# Patient Record
Sex: FEMALE | Race: WHITE | NOT HISPANIC OR LATINO | Employment: OTHER | URBAN - NONMETROPOLITAN AREA
[De-identification: names, ages, dates, MRNs, and addresses within clinical notes are randomized per-mention and may not be internally consistent; named-entity substitution may affect disease eponyms.]

---

## 2025-03-10 ENCOUNTER — APPOINTMENT (OUTPATIENT)
Dept: CARDIOLOGY | Facility: HOSPITAL | Age: 70
End: 2025-03-10
Payer: MEDICARE

## 2025-03-10 ENCOUNTER — APPOINTMENT (OUTPATIENT)
Dept: RADIOLOGY | Facility: HOSPITAL | Age: 70
End: 2025-03-10
Payer: MEDICARE

## 2025-03-10 ENCOUNTER — HOSPITAL ENCOUNTER (OUTPATIENT)
Facility: HOSPITAL | Age: 70
Setting detail: OBSERVATION
Discharge: HOME | End: 2025-03-12
Attending: INTERNAL MEDICINE | Admitting: INTERNAL MEDICINE
Payer: MEDICARE

## 2025-03-10 DIAGNOSIS — R09.02 HYPOXIA: ICD-10-CM
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
## 2025-03-10 DIAGNOSIS — S22.42XA CLOSED FRACTURE OF MULTIPLE RIBS OF LEFT SIDE, INITIAL ENCOUNTER: ICD-10-CM

## 2025-03-10 DIAGNOSIS — S22.42XA MULTIPLE FRACTURES OF RIBS, LEFT SIDE, INITIAL ENCOUNTER FOR CLOSED FRACTURE: ICD-10-CM

## 2025-03-10 LAB
ALBUMIN SERPL BCP-MCNC: 4.6 G/DL (ref 3.4–5)
ALP SERPL-CCNC: 106 U/L (ref 33–136)
ALT SERPL W P-5'-P-CCNC: 44 U/L (ref 7–45)
ANION GAP SERPL CALC-SCNC: 16 MMOL/L (ref 10–20)
AST SERPL W P-5'-P-CCNC: 30 U/L (ref 9–39)
ATRIAL RATE: 86 BPM
BASOPHILS # BLD AUTO: 0.07 X10*3/UL (ref 0–0.1)
BASOPHILS NFR BLD AUTO: 0.8 %
BILIRUB SERPL-MCNC: 1 MG/DL (ref 0–1.2)
BUN SERPL-MCNC: 10 MG/DL (ref 6–23)
CALCIUM SERPL-MCNC: 10.2 MG/DL (ref 8.6–10.3)
CARDIAC TROPONIN I PNL SERPL HS: 5 NG/L (ref 0–13)
CHLORIDE SERPL-SCNC: 99 MMOL/L (ref 98–107)
CO2 SERPL-SCNC: 25 MMOL/L (ref 21–32)
CREAT SERPL-MCNC: 0.68 MG/DL (ref 0.5–1.05)
EGFRCR SERPLBLD CKD-EPI 2021: >90 ML/MIN/1.73M*2
EOSINOPHIL # BLD AUTO: 0.03 X10*3/UL (ref 0–0.7)
EOSINOPHIL NFR BLD AUTO: 0.3 %
ERYTHROCYTE [DISTWIDTH] IN BLOOD BY AUTOMATED COUNT: 12.5 % (ref 11.5–14.5)
GLUCOSE SERPL-MCNC: 116 MG/DL (ref 74–99)
HCT VFR BLD AUTO: 45.3 % (ref 36–46)
HGB BLD-MCNC: 15.2 G/DL (ref 12–16)
IMM GRANULOCYTES # BLD AUTO: 0.02 X10*3/UL (ref 0–0.7)
IMM GRANULOCYTES NFR BLD AUTO: 0.2 % (ref 0–0.9)
LYMPHOCYTES # BLD AUTO: 2.03 X10*3/UL (ref 1.2–4.8)
LYMPHOCYTES NFR BLD AUTO: 22.8 %
MCH RBC QN AUTO: 31.7 PG (ref 26–34)
MCHC RBC AUTO-ENTMCNC: 33.6 G/DL (ref 32–36)
MCV RBC AUTO: 94 FL (ref 80–100)
MONOCYTES # BLD AUTO: 0.6 X10*3/UL (ref 0.1–1)
MONOCYTES NFR BLD AUTO: 6.7 %
NEUTROPHILS # BLD AUTO: 6.16 X10*3/UL (ref 1.2–7.7)
NEUTROPHILS NFR BLD AUTO: 69.2 %
NRBC BLD-RTO: 0 /100 WBCS (ref 0–0)
P AXIS: 33 DEGREES
P OFFSET: 202 MS
P ONSET: 150 MS
PLATELET # BLD AUTO: 203 X10*3/UL (ref 150–450)
POTASSIUM SERPL-SCNC: 4.2 MMOL/L (ref 3.5–5.3)
PR INTERVAL: 142 MS
PROT SERPL-MCNC: 7.6 G/DL (ref 6.4–8.2)
Q ONSET: 221 MS
QRS COUNT: 14 BEATS
QRS DURATION: 86 MS
QT INTERVAL: 386 MS
QTC CALCULATION(BAZETT): 461 MS
QTC FREDERICIA: 435 MS
R AXIS: 20 DEGREES
RBC # BLD AUTO: 4.8 X10*6/UL (ref 4–5.2)
SODIUM SERPL-SCNC: 136 MMOL/L (ref 136–145)
T AXIS: 38 DEGREES
T OFFSET: 414 MS
VENTRICULAR RATE: 86 BPM
WBC # BLD AUTO: 8.9 X10*3/UL (ref 4.4–11.3)

## 2025-03-10 PROCEDURE — 2500000005 HC RX 250 GENERAL PHARMACY W/O HCPCS: Performed by: NURSE PRACTITIONER

## 2025-03-10 PROCEDURE — 2500000005 HC RX 250 GENERAL PHARMACY W/O HCPCS: Performed by: PHYSICIAN ASSISTANT

## 2025-03-10 PROCEDURE — G0378 HOSPITAL OBSERVATION PER HR: HCPCS

## 2025-03-10 PROCEDURE — 84075 ASSAY ALKALINE PHOSPHATASE: CPT | Performed by: PHYSICIAN ASSISTANT

## 2025-03-10 PROCEDURE — 93005 ELECTROCARDIOGRAM TRACING: CPT

## 2025-03-10 PROCEDURE — 94760 N-INVAS EAR/PLS OXIMETRY 1: CPT

## 2025-03-10 PROCEDURE — 71250 CT THORAX DX C-: CPT

## 2025-03-10 PROCEDURE — 84484 ASSAY OF TROPONIN QUANT: CPT | Performed by: PHYSICIAN ASSISTANT

## 2025-03-10 PROCEDURE — 94760 N-INVAS EAR/PLS OXIMETRY 1: CPT | Mod: MUE

## 2025-03-10 PROCEDURE — 71101 X-RAY EXAM UNILAT RIBS/CHEST: CPT | Mod: LT

## 2025-03-10 PROCEDURE — 36415 COLL VENOUS BLD VENIPUNCTURE: CPT | Performed by: PHYSICIAN ASSISTANT

## 2025-03-10 PROCEDURE — 96374 THER/PROPH/DIAG INJ IV PUSH: CPT

## 2025-03-10 PROCEDURE — 99285 EMERGENCY DEPT VISIT HI MDM: CPT | Mod: 25

## 2025-03-10 PROCEDURE — 2500000001 HC RX 250 WO HCPCS SELF ADMINISTERED DRUGS (ALT 637 FOR MEDICARE OP): Performed by: NURSE PRACTITIONER

## 2025-03-10 PROCEDURE — 2500000004 HC RX 250 GENERAL PHARMACY W/ HCPCS (ALT 636 FOR OP/ED): Performed by: PHYSICIAN ASSISTANT

## 2025-03-10 PROCEDURE — 9420000001 HC RT PATIENT EDUCATION 5 MIN

## 2025-03-10 PROCEDURE — 85025 COMPLETE CBC W/AUTO DIFF WBC: CPT | Performed by: PHYSICIAN ASSISTANT

## 2025-03-10 RX ORDER — GUAIFENESIN/DEXTROMETHORPHAN 100-10MG/5
5 SYRUP ORAL EVERY 4 HOURS PRN
Status: DISCONTINUED | OUTPATIENT
Start: 2025-03-10 | End: 2025-03-12 | Stop reason: HOSPADM

## 2025-03-10 RX ORDER — POLYETHYLENE GLYCOL 3350 17 G/17G
17 POWDER, FOR SOLUTION ORAL DAILY PRN
Status: DISCONTINUED | OUTPATIENT
Start: 2025-03-10 | End: 2025-03-12 | Stop reason: HOSPADM

## 2025-03-10 RX ORDER — HYDROCODONE BITARTRATE AND ACETAMINOPHEN 5; 325 MG/1; MG/1
1 TABLET ORAL EVERY 6 HOURS PRN
Qty: 12 TABLET | Refills: 0 | Status: SHIPPED | OUTPATIENT
Start: 2025-03-10 | End: 2025-03-10 | Stop reason: WASHOUT

## 2025-03-10 RX ORDER — ENOXAPARIN SODIUM 100 MG/ML
40 INJECTION SUBCUTANEOUS EVERY 24 HOURS
Status: DISCONTINUED | OUTPATIENT
Start: 2025-03-10 | End: 2025-03-12 | Stop reason: HOSPADM

## 2025-03-10 RX ORDER — SERTRALINE HYDROCHLORIDE 50 MG/1
50 TABLET, FILM COATED ORAL DAILY
COMMUNITY

## 2025-03-10 RX ORDER — MORPHINE SULFATE 4 MG/ML
4 INJECTION, SOLUTION INTRAMUSCULAR; INTRAVENOUS ONCE
Status: COMPLETED | OUTPATIENT
Start: 2025-03-10 | End: 2025-03-10

## 2025-03-10 RX ORDER — PANTOPRAZOLE SODIUM 40 MG/1
40 TABLET, DELAYED RELEASE ORAL
Status: DISCONTINUED | OUTPATIENT
Start: 2025-03-11 | End: 2025-03-12 | Stop reason: HOSPADM

## 2025-03-10 RX ORDER — AMLODIPINE BESYLATE 2.5 MG/1
2.5 TABLET ORAL DAILY
COMMUNITY

## 2025-03-10 RX ORDER — GUAIFENESIN 600 MG/1
600 TABLET, EXTENDED RELEASE ORAL EVERY 12 HOURS PRN
Status: DISCONTINUED | OUTPATIENT
Start: 2025-03-10 | End: 2025-03-12 | Stop reason: HOSPADM

## 2025-03-10 RX ORDER — ONDANSETRON HYDROCHLORIDE 2 MG/ML
4 INJECTION, SOLUTION INTRAVENOUS EVERY 8 HOURS PRN
Status: DISCONTINUED | OUTPATIENT
Start: 2025-03-10 | End: 2025-03-12 | Stop reason: HOSPADM

## 2025-03-10 RX ORDER — ACETAMINOPHEN 325 MG/1
975 TABLET ORAL EVERY 8 HOURS
Status: DISCONTINUED | OUTPATIENT
Start: 2025-03-10 | End: 2025-03-12 | Stop reason: HOSPADM

## 2025-03-10 RX ORDER — LIDOCAINE 50 MG/G
1 PATCH TOPICAL DAILY
Qty: 5 PATCH | Refills: 0 | Status: SHIPPED | OUTPATIENT
Start: 2025-03-10 | End: 2025-03-10 | Stop reason: WASHOUT

## 2025-03-10 RX ORDER — VIT C/E/ZN/COPPR/LUTEIN/ZEAXAN 250MG-90MG
25 CAPSULE ORAL DAILY
COMMUNITY

## 2025-03-10 RX ORDER — ONDANSETRON HYDROCHLORIDE 2 MG/ML
4 INJECTION, SOLUTION INTRAVENOUS ONCE
Status: DISCONTINUED | OUTPATIENT
Start: 2025-03-10 | End: 2025-03-12 | Stop reason: HOSPADM

## 2025-03-10 RX ORDER — OXYCODONE HYDROCHLORIDE 5 MG/1
5 TABLET ORAL EVERY 6 HOURS PRN
Status: DISCONTINUED | OUTPATIENT
Start: 2025-03-10 | End: 2025-03-12 | Stop reason: HOSPADM

## 2025-03-10 RX ORDER — LIDOCAINE 560 MG/1
1 PATCH PERCUTANEOUS; TOPICAL; TRANSDERMAL DAILY
Status: DISCONTINUED | OUTPATIENT
Start: 2025-03-10 | End: 2025-03-12 | Stop reason: HOSPADM

## 2025-03-10 RX ORDER — VIT C/E/ZN/COPPR/LUTEIN/ZEAXAN 250MG-90MG
500 CAPSULE ORAL DAILY
COMMUNITY

## 2025-03-10 RX ORDER — MORPHINE SULFATE 4 MG/ML
4 INJECTION, SOLUTION INTRAMUSCULAR; INTRAVENOUS ONCE
Status: DISCONTINUED | OUTPATIENT
Start: 2025-03-10 | End: 2025-03-11

## 2025-03-10 RX ADMIN — MORPHINE SULFATE 4 MG: 4 INJECTION, SOLUTION INTRAMUSCULAR; INTRAVENOUS at 17:38

## 2025-03-10 RX ADMIN — OXYCODONE 5 MG: 5 TABLET ORAL at 21:50

## 2025-03-10 RX ADMIN — Medication 2 L/MIN: at 17:52

## 2025-03-10 RX ADMIN — Medication 2 L/MIN: at 22:43

## 2025-03-10 RX ADMIN — LIDOCAINE 1 PATCH: 560 PATCH PERCUTANEOUS; TOPICAL; TRANSDERMAL at 21:50

## 2025-03-10 RX ADMIN — Medication 2 L/MIN: at 16:50

## 2025-03-10 SDOH — ECONOMIC STABILITY: FOOD INSECURITY: WITHIN THE PAST 12 MONTHS, THE FOOD YOU BOUGHT JUST DIDN'T LAST AND YOU DIDN'T HAVE MONEY TO GET MORE.: NEVER TRUE

## 2025-03-10 SDOH — SOCIAL STABILITY: SOCIAL INSECURITY: HAS ANYONE EVER THREATENED TO HURT YOUR FAMILY OR YOUR PETS?: NO

## 2025-03-10 SDOH — ECONOMIC STABILITY: FOOD INSECURITY: HOW HARD IS IT FOR YOU TO PAY FOR THE VERY BASICS LIKE FOOD, HOUSING, MEDICAL CARE, AND HEATING?: NOT HARD AT ALL

## 2025-03-10 SDOH — SOCIAL STABILITY: SOCIAL INSECURITY
WITHIN THE LAST YEAR, HAVE YOU BEEN KICKED, HIT, SLAPPED, OR OTHERWISE PHYSICALLY HURT BY YOUR PARTNER OR EX-PARTNER?: NO

## 2025-03-10 SDOH — SOCIAL STABILITY: SOCIAL INSECURITY: WITHIN THE LAST YEAR, HAVE YOU BEEN HUMILIATED OR EMOTIONALLY ABUSED IN OTHER WAYS BY YOUR PARTNER OR EX-PARTNER?: NO

## 2025-03-10 SDOH — SOCIAL STABILITY: SOCIAL INSECURITY: WITHIN THE LAST YEAR, HAVE YOU BEEN AFRAID OF YOUR PARTNER OR EX-PARTNER?: NO

## 2025-03-10 SDOH — ECONOMIC STABILITY: HOUSING INSECURITY: IN THE LAST 12 MONTHS, WAS THERE A TIME WHEN YOU WERE NOT ABLE TO PAY THE MORTGAGE OR RENT ON TIME?: NO

## 2025-03-10 SDOH — SOCIAL STABILITY: SOCIAL INSECURITY: DO YOU FEEL UNSAFE GOING BACK TO THE PLACE WHERE YOU ARE LIVING?: NO

## 2025-03-10 SDOH — ECONOMIC STABILITY: INCOME INSECURITY: IN THE PAST 12 MONTHS HAS THE ELECTRIC, GAS, OIL, OR WATER COMPANY THREATENED TO SHUT OFF SERVICES IN YOUR HOME?: NO

## 2025-03-10 SDOH — SOCIAL STABILITY: SOCIAL INSECURITY
WITHIN THE LAST YEAR, HAVE YOU BEEN RAPED OR FORCED TO HAVE ANY KIND OF SEXUAL ACTIVITY BY YOUR PARTNER OR EX-PARTNER?: NO

## 2025-03-10 SDOH — SOCIAL STABILITY: SOCIAL INSECURITY: HAVE YOU HAD THOUGHTS OF HARMING ANYONE ELSE?: NO

## 2025-03-10 SDOH — SOCIAL STABILITY: SOCIAL INSECURITY: ARE THERE ANY APPARENT SIGNS OF INJURIES/BEHAVIORS THAT COULD BE RELATED TO ABUSE/NEGLECT?: NO

## 2025-03-10 SDOH — ECONOMIC STABILITY: FOOD INSECURITY: WITHIN THE PAST 12 MONTHS, YOU WORRIED THAT YOUR FOOD WOULD RUN OUT BEFORE YOU GOT THE MONEY TO BUY MORE.: NEVER TRUE

## 2025-03-10 SDOH — ECONOMIC STABILITY: HOUSING INSECURITY: IN THE PAST 12 MONTHS, HOW MANY TIMES HAVE YOU MOVED WHERE YOU WERE LIVING?: 1

## 2025-03-10 SDOH — SOCIAL STABILITY: SOCIAL INSECURITY: WERE YOU ABLE TO COMPLETE ALL THE BEHAVIORAL HEALTH SCREENINGS?: YES

## 2025-03-10 SDOH — SOCIAL STABILITY: SOCIAL INSECURITY: ABUSE: ADULT

## 2025-03-10 SDOH — SOCIAL STABILITY: SOCIAL INSECURITY: ARE YOU OR HAVE YOU BEEN THREATENED OR ABUSED PHYSICALLY, EMOTIONALLY, OR SEXUALLY BY ANYONE?: NO

## 2025-03-10 SDOH — SOCIAL STABILITY: SOCIAL INSECURITY: HAVE YOU HAD ANY THOUGHTS OF HARMING ANYONE ELSE?: NO

## 2025-03-10 SDOH — ECONOMIC STABILITY: TRANSPORTATION INSECURITY: IN THE PAST 12 MONTHS, HAS LACK OF TRANSPORTATION KEPT YOU FROM MEDICAL APPOINTMENTS OR FROM GETTING MEDICATIONS?: NO

## 2025-03-10 SDOH — SOCIAL STABILITY: SOCIAL INSECURITY: DO YOU FEEL ANYONE HAS EXPLOITED OR TAKEN ADVANTAGE OF YOU FINANCIALLY OR OF YOUR PERSONAL PROPERTY?: NO

## 2025-03-10 ASSESSMENT — PAIN SCALES - GENERAL
PAINLEVEL_OUTOF10: 0 - NO PAIN
PAINLEVEL_OUTOF10: 8
PAINLEVEL_OUTOF10: 8
PAINLEVEL_OUTOF10: 10 - WORST POSSIBLE PAIN

## 2025-03-10 ASSESSMENT — COGNITIVE AND FUNCTIONAL STATUS - GENERAL
PATIENT BASELINE BEDBOUND: NO
MOBILITY SCORE: 24
DAILY ACTIVITIY SCORE: 24

## 2025-03-10 ASSESSMENT — ACTIVITIES OF DAILY LIVING (ADL)
LACK_OF_TRANSPORTATION: NO
HEARING - LEFT EAR: FUNCTIONAL
BATHING: INDEPENDENT
TOILETING: INDEPENDENT
JUDGMENT_ADEQUATE_SAFELY_COMPLETE_DAILY_ACTIVITIES: YES
PATIENT'S MEMORY ADEQUATE TO SAFELY COMPLETE DAILY ACTIVITIES?: YES
GROOMING: INDEPENDENT
HEARING - RIGHT EAR: FUNCTIONAL
WALKS IN HOME: INDEPENDENT
LACK_OF_TRANSPORTATION: NO
ADEQUATE_TO_COMPLETE_ADL: YES
FEEDING YOURSELF: INDEPENDENT
DRESSING YOURSELF: INDEPENDENT

## 2025-03-10 ASSESSMENT — PAIN DESCRIPTION - LOCATION
LOCATION: RIB CAGE
LOCATION: RIB CAGE

## 2025-03-10 ASSESSMENT — LIFESTYLE VARIABLES
AUDIT-C TOTAL SCORE: 1
HOW OFTEN DO YOU HAVE 6 OR MORE DRINKS ON ONE OCCASION: NEVER
AUDIT-C TOTAL SCORE: 1
SKIP TO QUESTIONS 9-10: 1
HOW OFTEN DO YOU HAVE A DRINK CONTAINING ALCOHOL: MONTHLY OR LESS
HOW MANY STANDARD DRINKS CONTAINING ALCOHOL DO YOU HAVE ON A TYPICAL DAY: 1 OR 2

## 2025-03-10 ASSESSMENT — PAIN DESCRIPTION - ORIENTATION
ORIENTATION: LEFT
ORIENTATION: LEFT

## 2025-03-10 ASSESSMENT — PAIN DESCRIPTION - PAIN TYPE: TYPE: ACUTE PAIN

## 2025-03-10 ASSESSMENT — PAIN SCALES - WONG BAKER: WONGBAKER_NUMERICALRESPONSE: NO HURT

## 2025-03-10 ASSESSMENT — COLUMBIA-SUICIDE SEVERITY RATING SCALE - C-SSRS
1. IN THE PAST MONTH, HAVE YOU WISHED YOU WERE DEAD OR WISHED YOU COULD GO TO SLEEP AND NOT WAKE UP?: NO
2. HAVE YOU ACTUALLY HAD ANY THOUGHTS OF KILLING YOURSELF?: NO
6. HAVE YOU EVER DONE ANYTHING, STARTED TO DO ANYTHING, OR PREPARED TO DO ANYTHING TO END YOUR LIFE?: NO

## 2025-03-10 ASSESSMENT — PATIENT HEALTH QUESTIONNAIRE - PHQ9
1. LITTLE INTEREST OR PLEASURE IN DOING THINGS: NOT AT ALL
2. FEELING DOWN, DEPRESSED OR HOPELESS: NOT AT ALL
SUM OF ALL RESPONSES TO PHQ9 QUESTIONS 1 & 2: 0

## 2025-03-10 ASSESSMENT — PAIN DESCRIPTION - DESCRIPTORS: DESCRIPTORS: DISCOMFORT

## 2025-03-10 ASSESSMENT — PAIN - FUNCTIONAL ASSESSMENT
PAIN_FUNCTIONAL_ASSESSMENT: 0-10
PAIN_FUNCTIONAL_ASSESSMENT: WONG-BAKER FACES
PAIN_FUNCTIONAL_ASSESSMENT: 0-10

## 2025-03-10 NOTE — NURSING NOTE
Patient is a new admit from ED to med/surg room 228. Patient arrived to floor a few minutes ago. Patient oriented to staff, room and call light. Vss. Patient is stable at this time sitting in recliner chair with call light in reach. Gave patient a box lunch.

## 2025-03-10 NOTE — ED TRIAGE NOTES
Pt states last night she tripped and fell, injuring her L rib when she fell onto the shower divider. Pt also hit her head and L elbow, no LOC, no blood thinners

## 2025-03-11 PROBLEM — R09.02 HYPOXIA: Status: ACTIVE | Noted: 2025-03-11
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
## 2025-03-11 PROBLEM — I10 HTN (HYPERTENSION): Status: ACTIVE | Noted: 2025-03-11

## 2025-03-11 PROBLEM — F32.A DEPRESSION: Status: ACTIVE | Noted: 2025-03-11

## 2025-03-11 PROBLEM — E55.9 VITAMIN D DEFICIENCY: Status: ACTIVE | Noted: 2025-03-11

## 2025-03-11 LAB
ANION GAP SERPL CALC-SCNC: 11 MMOL/L (ref 10–20)
BUN SERPL-MCNC: 13 MG/DL (ref 6–23)
CALCIUM SERPL-MCNC: 9.7 MG/DL (ref 8.6–10.3)
CHLORIDE SERPL-SCNC: 103 MMOL/L (ref 98–107)
CO2 SERPL-SCNC: 29 MMOL/L (ref 21–32)
CREAT SERPL-MCNC: 0.75 MG/DL (ref 0.5–1.05)
EGFRCR SERPLBLD CKD-EPI 2021: 87 ML/MIN/1.73M*2
ERYTHROCYTE [DISTWIDTH] IN BLOOD BY AUTOMATED COUNT: 12.9 % (ref 11.5–14.5)
GLUCOSE SERPL-MCNC: 114 MG/DL (ref 74–99)
HCT VFR BLD AUTO: 42.1 % (ref 36–46)
HGB BLD-MCNC: 13.8 G/DL (ref 12–16)
MCH RBC QN AUTO: 31.2 PG (ref 26–34)
MCHC RBC AUTO-ENTMCNC: 32.8 G/DL (ref 32–36)
MCV RBC AUTO: 95 FL (ref 80–100)
NRBC BLD-RTO: 0 /100 WBCS (ref 0–0)
PLATELET # BLD AUTO: 185 X10*3/UL (ref 150–450)
POTASSIUM SERPL-SCNC: 3.8 MMOL/L (ref 3.5–5.3)
RBC # BLD AUTO: 4.43 X10*6/UL (ref 4–5.2)
SODIUM SERPL-SCNC: 139 MMOL/L (ref 136–145)
WBC # BLD AUTO: 7.2 X10*3/UL (ref 4.4–11.3)

## 2025-03-11 PROCEDURE — G0378 HOSPITAL OBSERVATION PER HR: HCPCS

## 2025-03-11 PROCEDURE — 94760 N-INVAS EAR/PLS OXIMETRY 1: CPT

## 2025-03-11 PROCEDURE — 97165 OT EVAL LOW COMPLEX 30 MIN: CPT | Mod: GO

## 2025-03-11 PROCEDURE — 2500000005 HC RX 250 GENERAL PHARMACY W/O HCPCS: Performed by: PHYSICIAN ASSISTANT

## 2025-03-11 PROCEDURE — 36415 COLL VENOUS BLD VENIPUNCTURE: CPT | Performed by: NURSE PRACTITIONER

## 2025-03-11 PROCEDURE — 2500000005 HC RX 250 GENERAL PHARMACY W/O HCPCS: Performed by: NURSE PRACTITIONER

## 2025-03-11 PROCEDURE — 97116 GAIT TRAINING THERAPY: CPT | Mod: GP | Performed by: PHYSICAL THERAPIST

## 2025-03-11 PROCEDURE — 96372 THER/PROPH/DIAG INJ SC/IM: CPT | Performed by: NURSE PRACTITIONER

## 2025-03-11 PROCEDURE — 97530 THERAPEUTIC ACTIVITIES: CPT | Mod: GO

## 2025-03-11 PROCEDURE — 2500000004 HC RX 250 GENERAL PHARMACY W/ HCPCS (ALT 636 FOR OP/ED): Performed by: NURSE PRACTITIONER

## 2025-03-11 PROCEDURE — 2500000002 HC RX 250 W HCPCS SELF ADMINISTERED DRUGS (ALT 637 FOR MEDICARE OP, ALT 636 FOR OP/ED): Mod: MUE

## 2025-03-11 PROCEDURE — 99223 1ST HOSP IP/OBS HIGH 75: CPT

## 2025-03-11 PROCEDURE — 85027 COMPLETE CBC AUTOMATED: CPT | Performed by: NURSE PRACTITIONER

## 2025-03-11 PROCEDURE — 82565 ASSAY OF CREATININE: CPT | Performed by: NURSE PRACTITIONER

## 2025-03-11 PROCEDURE — 97161 PT EVAL LOW COMPLEX 20 MIN: CPT | Mod: GP | Performed by: PHYSICAL THERAPIST

## 2025-03-11 PROCEDURE — 2500000001 HC RX 250 WO HCPCS SELF ADMINISTERED DRUGS (ALT 637 FOR MEDICARE OP)

## 2025-03-11 PROCEDURE — 2500000001 HC RX 250 WO HCPCS SELF ADMINISTERED DRUGS (ALT 637 FOR MEDICARE OP): Performed by: NURSE PRACTITIONER

## 2025-03-11 RX ORDER — AMLODIPINE BESYLATE 2.5 MG/1
2.5 TABLET ORAL DAILY
Status: DISCONTINUED | OUTPATIENT
Start: 2025-03-11 | End: 2025-03-12 | Stop reason: HOSPADM

## 2025-03-11 RX ORDER — PNV NO.95/FERROUS FUM/FOLIC AC 28MG-0.8MG
500 TABLET ORAL DAILY
Status: DISCONTINUED | OUTPATIENT
Start: 2025-03-11 | End: 2025-03-12 | Stop reason: HOSPADM

## 2025-03-11 RX ORDER — TIZANIDINE 4 MG/1
4 TABLET ORAL EVERY 8 HOURS PRN
Status: DISCONTINUED | OUTPATIENT
Start: 2025-03-11 | End: 2025-03-12 | Stop reason: HOSPADM

## 2025-03-11 RX ORDER — CHOLECALCIFEROL (VITAMIN D3) 25 MCG
1000 TABLET ORAL DAILY
Status: DISCONTINUED | OUTPATIENT
Start: 2025-03-11 | End: 2025-03-12 | Stop reason: HOSPADM

## 2025-03-11 RX ORDER — SERTRALINE HYDROCHLORIDE 50 MG/1
50 TABLET, FILM COATED ORAL DAILY
Status: DISCONTINUED | OUTPATIENT
Start: 2025-03-11 | End: 2025-03-12 | Stop reason: HOSPADM

## 2025-03-11 RX ADMIN — ACETAMINOPHEN 975 MG: 325 TABLET, FILM COATED ORAL at 20:12

## 2025-03-11 RX ADMIN — POLYETHYLENE GLYCOL 3350 17 G: 17 POWDER, FOR SOLUTION ORAL at 20:11

## 2025-03-11 RX ADMIN — OXYCODONE 5 MG: 5 TABLET ORAL at 12:11

## 2025-03-11 RX ADMIN — Medication 1000 UNITS: at 12:11

## 2025-03-11 RX ADMIN — OXYCODONE 5 MG: 5 TABLET ORAL at 05:25

## 2025-03-11 RX ADMIN — ENOXAPARIN SODIUM 40 MG: 40 INJECTION SUBCUTANEOUS at 20:13

## 2025-03-11 RX ADMIN — Medication 2 L/MIN: at 21:56

## 2025-03-11 RX ADMIN — PANTOPRAZOLE SODIUM 40 MG: 40 TABLET, DELAYED RELEASE ORAL at 06:01

## 2025-03-11 RX ADMIN — ACETAMINOPHEN 975 MG: 325 TABLET, FILM COATED ORAL at 12:11

## 2025-03-11 RX ADMIN — TIZANIDINE 4 MG: 4 TABLET ORAL at 10:50

## 2025-03-11 RX ADMIN — VITAM B12 500 MCG: 100 TAB at 12:12

## 2025-03-11 RX ADMIN — LIDOCAINE 1 PATCH: 560 PATCH PERCUTANEOUS; TOPICAL; TRANSDERMAL at 09:04

## 2025-03-11 RX ADMIN — SERTRALINE HYDROCHLORIDE 50 MG: 50 TABLET ORAL at 12:11

## 2025-03-11 RX ADMIN — TIZANIDINE 4 MG: 4 TABLET ORAL at 20:12

## 2025-03-11 RX ADMIN — AMLODIPINE BESYLATE 2.5 MG: 2.5 TABLET ORAL at 12:11

## 2025-03-11 ASSESSMENT — ENCOUNTER SYMPTOMS
DIZZINESS: 0
DIFFICULTY URINATING: 0
VOMITING: 0
NAUSEA: 0
DYSURIA: 0
FEVER: 0
LIGHT-HEADEDNESS: 0
WEAKNESS: 0
ABDOMINAL PAIN: 0
CHILLS: 0

## 2025-03-11 ASSESSMENT — COGNITIVE AND FUNCTIONAL STATUS - GENERAL
STANDING UP FROM CHAIR USING ARMS: A LITTLE
MOVING TO AND FROM BED TO CHAIR: A LITTLE
DAILY ACTIVITIY SCORE: 20
HELP NEEDED FOR BATHING: A LITTLE
DRESSING REGULAR LOWER BODY CLOTHING: A LITTLE
MOVING TO AND FROM BED TO CHAIR: A LITTLE
TURNING FROM BACK TO SIDE WHILE IN FLAT BAD: A LITTLE
DRESSING REGULAR LOWER BODY CLOTHING: A LITTLE
TOILETING: A LITTLE
MOBILITY SCORE: 17
CLIMB 3 TO 5 STEPS WITH RAILING: A LITTLE
WALKING IN HOSPITAL ROOM: A LITTLE
TURNING FROM BACK TO SIDE WHILE IN FLAT BAD: A LOT
MOBILITY SCORE: 20
HELP NEEDED FOR BATHING: A LITTLE
DRESSING REGULAR UPPER BODY CLOTHING: A LITTLE
PERSONAL GROOMING: A LITTLE
DAILY ACTIVITIY SCORE: 20
CLIMB 3 TO 5 STEPS WITH RAILING: A LITTLE
DRESSING REGULAR UPPER BODY CLOTHING: A LITTLE
MOVING FROM LYING ON BACK TO SITTING ON SIDE OF FLAT BED WITH BEDRAILS: A LITTLE
STANDING UP FROM CHAIR USING ARMS: A LITTLE

## 2025-03-11 ASSESSMENT — PAIN DESCRIPTION - DESCRIPTORS
DESCRIPTORS: SPASM
DESCRIPTORS: SPASM

## 2025-03-11 ASSESSMENT — ACTIVITIES OF DAILY LIVING (ADL)
LACK_OF_TRANSPORTATION: NO
LACK_OF_TRANSPORTATION: NO
ADL_ASSISTANCE: INDEPENDENT
ADL_ASSISTANCE: INDEPENDENT
BATHING_ASSISTANCE: MINIMAL

## 2025-03-11 ASSESSMENT — PAIN DESCRIPTION - ORIENTATION
ORIENTATION: LEFT;UPPER
ORIENTATION: LEFT
ORIENTATION: LEFT

## 2025-03-11 ASSESSMENT — PAIN DESCRIPTION - LOCATION
LOCATION: RIB CAGE
LOCATION: RIB CAGE
LOCATION: ABDOMEN

## 2025-03-11 ASSESSMENT — PAIN - FUNCTIONAL ASSESSMENT
PAIN_FUNCTIONAL_ASSESSMENT: 0-10

## 2025-03-11 ASSESSMENT — PAIN SCALES - GENERAL
PAINLEVEL_OUTOF10: 3
PAINLEVEL_OUTOF10: 3
PAINLEVEL_OUTOF10: 8
PAINLEVEL_OUTOF10: 3
PAINLEVEL_OUTOF10: 3
PAINLEVEL_OUTOF10: 8
PAINLEVEL_OUTOF10: 5 - MODERATE PAIN

## 2025-03-11 NOTE — PROGRESS NOTES
PATIENT INSTRUCTIONS    Treatment:  Handout provided with Iliotibial Band exercises  Continue home exercise program    Ice - 2 to 3 times daily for 15 minutes     Follow-Up:  Dr Archibald - April 5, 2024 at 9:00 am [facility] [place]     Referral:  none    Time left: 2/27/2024 2:47 PM     Next appointment:        Location:        Provider:         Please note: 24 hour notice for cancellation of appointment is required.    You may receive a survey in the mail, or via the e-mail address that you have provided.  We would appreciate if you could fill out the survey and provide us with any feedback on your experience regarding your visit today. Thank you for allowing us to provide you with your health care needs.     Do not hesitate to call if you are experiencing severe pain, worsening or change in your pain, have symptoms of infection (fever, warmth, redness, increased drainage), or have any other problem that concerns you ~ [phone] (or [phone] after hours).    Please remember when requesting refills on pain medication that the request should be made by Thursday at the latest. [facility] is open Monday-Friday, 8am-5pm, and closed on the weekends.  No narcotic refills will be filled after hours.    Additional Educational Resources:  For additional resources regarding your symptoms, diagnosis, or further health information, please visit the Health Resources section on [email or web address] or the Online Health Resources section in [email or web address].       Physical Therapy    Physical Therapy Evaluation & Treatment    Patient Name: Kira Nino  MRN: [id number]  Department: [facility]  Room: [address] [place], [place] [address]  Today's Date: 3/11/2025   Time Calculation  Start Time: 0902  Stop Time: 0925  Time Calculation (min): 23 min    Assessment/Plan   PT Assessment  PT Assessment Results: Decreased endurance, Decreased mobility, Pain  Rehab Prognosis: Good  Barriers to Discharge Home:  (Concern for Oxygen dropping 2nd to pain with breathing.  Monitoring patient.  Dr Sands monitoring for lung contusion getting worse.  Encouraged deeper breathing.)  Evaluation/Treatment Tolerance: Patient limited by pain, Patient tolerated treatment well  Medical Staff Made Aware: Yes  Strengths: Ability to acquire knowledge, Attitude of self, Capable of completing ADLs semi/independent, Insight into problems, Living arrangement secure, Premorbid level of function, Support of Caregivers  End of Session Communication: Bedside nurse  Assessment Comment: 69 F with L sided Rib Fx's and Lung contusion.  Bruising present.  Pt currently on O2 2 L with O2 sat at 95%.  O2 consistent with O2 on.  Walked without the O2 and PO2 dropped slowly to 90% and returned to wnl with encouragment to breath deeper.  Pt needed more help with transfer from bed, rolling and side to sit mod.  Expect pt to continue to improve.  Anticipate low level PT intervention.  End of Session Patient Position: Up in chair, Alarm off, not on at start of session (Oxygen off.  Nurse notified.  Came back up to 93% quickly once sitting.)   IP OR SWING BED PT PLAN  Inpatient or Swing Bed: Inpatient (OBS)  PT Plan  Treatment/Interventions: Bed mobility, Transfer training, Gait training, Endurance training, Strengthening, Therapeutic activity, Positioning  PT Plan: Ongoing PT  PT Frequency: 4 times per week  PT Discharge Recommendations: Low intensity level of continued care  PT Recommended Transfer Status: Assist x1  PT - OK to Discharge: Yes      General Visit  Information:  General  Reason for Referral: Multiple Rib Fx's, Lung Contusion S/P Fall  Referred By: Madelyn Lovell PA-C  Past Medical History Relevant to Rehab: HTN, Depression, Hyster  Family/Caregiver Present: No  Prior to Session Communication: Bedside nurse (Ok to see the patient.)  Patient Position Received: Bed, 2 rail up, Alarm off, not on at start of session  Preferred Learning Style: verbal  General Comment: 69 F Pleasant and cooperative.  Alert and Oriented x 4.  Agreeable to PT evaluation.  Pt admitted through the ER s/p Fall.  Fell  on the shower divider.  Fx 7,8,9 with possible lung contusion.  PO2 dropped when ambulating in ER  Home Living:  Home Living  Type of Home: House ([place])  Lives With: Spouse  Home Adaptive Equipment: None  Home Layout: Multi-level, Laundry in basement, 1/2 bath on main level, Stairs to alternate level with rails  Alternate Level Stairs-Rails: Right  Alternate Level Stairs-Number of Steps: 12  Home Access: Stairs to enter with rails, Stairs to enter without rails  Bathroom Shower/Tub: Tub/shower unit, Walk-in shower (Walk in shower in basement)  Home Living Comments: Drives self  Prior Level of Function:  Prior Function Per Pt/Caregiver Report  Level of [place]: Independent with ADLs and functional transfers, Independent with homemaking with ambulation  Receives Help From: Family  ADL Assistance: Independent  Homemaking Assistance: Independent  Ambulatory Assistance: Independent  Prior Function Comments: Fully Independent  Precautions:  Precautions  Hearing/Visual Limitations: Glasses  Medical Precautions: Fall precautions, Oxygen therapy device and L/min  Precautions Comment: Dr Cardoza.  Doesn't want PT to push her to hard.  Monitoring Contusion.     Date/Time Vitals Session Patient Position Pulse Resp SpO2 BP MAP (mmHg)    03/11/25 0902 Pre PT  --  85  17  94 %  --  --            Objective   Pain:  Pain Assessment  Pain Assessment: 0-10  0-10 (Numeric) Pain Score:  3  Pain Type: Acute pain  Pain Location: Rib cage (L side with bruising present. About the size of my palm)  Pain Orientation: Left  Pain Descriptors: Spasm (sudden grabbing pain)  Pain Frequency: Intermittent  Pain Onset: Sudden  Pain Interventions: Compression, Medication (See MAR)  Response to Interventions: Resting quietly  Cognition:  Cognition  Overall Cognitive Status: Within Functional Limits  Attention: Within Functional Limits  Memory: Within Funtional Limits  Problem Solving: Within Functional Limits  Safety/Judgement: Within Functional Limits  Insight: Within function limits    General Assessments:  General Observation  General Observation: Pt mobilizing fairly well.  CGA to Min A.  with transfers, Ambulating and being upright was more comfortable than during the transfers     Activity Tolerance  Endurance: Tolerates less than 10 min exercise with changes in vital signs  Activity Tolerance Comments: Pt limited by pain when breathing.  Decrease in depth of breath causes PO2 to drop    Sensation  [place] Touch: No apparent deficits    Strength  Strength Comments: Grossly 4/5 B UE and B LE  Strength  Strength Comments: Grossly 4/5 B UE and B LE     Coordination  Movements are Fluid and Coordinated: Yes    Postural Control  Postural Control: Within Functional Limits  Posture Comment: Protective posturing of left chest with left arm    Static Sitting Balance  Static Sitting-Balance Support: Feet supported, Right upper extremity supported  Static Sitting-Level of Assistance: Close supervision  Static Sitting-Comment/Number of Minutes: Able to sit edge of bed and sit on the toilet without any problems    Static Standing Balance  Static Standing-Balance Support: No upper extremity supported  Static Standing-Level of Assistance: Close supervision (Stood for several minutes while the doctor was talking to her.  Oxygen slowly dropped from 93 to 91% but came back up rapidly)  Static Standing-Comment/Number of Minutes:  >5  Functional Assessments:  Bed Mobility  Bed Mobility: Yes  Bed Mobility 1  Bed Mobility 1: Rolling right, Rolling left, Log roll  Level of Assistance 1: Minimum assistance, Moderate verbal cues, +2  Bed Mobility Comments 1: Found rolling to the right was easier than rolling to the left.  Pt using pillow to compress rib cage.  Bed Mobility 2  Bed Mobility  2: Side lying right to sit  Level of Assistance 2: Moderate assistance, Moderate verbal cues  Bed Mobility Comments 2: Pt limited by pain and m spasm with movement  Bed Mobility 3  Bed Mobility 3: Scooting  Level of Assistance 3: Minimum assistance, Minimal verbal cues  Bed Mobility Comments 3: assist with getting the legs into postion    Transfers  Transfer: Yes  Transfer 1  Transfer From 1: Sit to  Transfer to 1: Stand  Technique 1: Sit to stand, Stand to sit  Transfer Level of Assistance 1: Close supervision  Trials/Comments 1: from Bed, from Toilet, Chair  Transfers 2  Transfer From 2: Stand to, Sit to  Transfer to 2: Toilet, Chair with arms  Technique 2: Sit to stand, Stand to sit  Transfer Level of Assistance 2: Close supervision, Minimal verbal cues    Ambulation/Gait Training  Ambulation/Gait Training Performed: Yes  Ambulation/Gait Training 1  Surface 1: Level tile  Device 1: No device  Assistance 1: Contact guard  Quality of Gait 1: Decreased step length (Protective posturing of the left rib cage.)  Comments/Distance (ft) 1: 10 feet to the toilet with O2 on.  30 ft x 2 with CGA without O2.  Dropped from 94 to 91%.  Returned to chair with Oxygen at 90%,  Returned to 93 after resting.  Extremity/Trunk Assessments:  RUE   RUE : Within Functional Limits  LUE   LUE: Within Functional Limits  RLE   RLE : Within Functional Limits  LLE   LLE : Within Functional Limits  Treatments:     Bed Mobility  Bed Mobility: Yes  Bed Mobility 1  Bed Mobility 1: Rolling right, Rolling left, Log roll  Level of Assistance 1: Minimum assistance, Moderate verbal cues, +2  Bed  Mobility Comments 1: Found rolling to the right was easier than rolling to the left.  Pt using pillow to compress rib cage.  Bed Mobility 2  Bed Mobility  2: Side lying right to sit  Level of Assistance 2: Moderate assistance, Moderate verbal cues  Bed Mobility Comments 2: Pt limited by pain and m spasm with movement  Bed Mobility 3  Bed Mobility 3: Scooting  Level of Assistance 3: Minimum assistance, Minimal verbal cues  Bed Mobility Comments 3: assist with getting the legs into postion    Ambulation/Gait Training  Ambulation/Gait Training Performed: Yes  Ambulation/Gait Training 1  Surface 1: Level tile  Device 1: No device  Assistance 1: Contact guard  Quality of Gait 1: Decreased step length (Protective posturing of the left rib cage.)  Comments/Distance (ft) 1: 10 feet to the toilet with O2 on.  30 ft x 2 with CGA without O2.  Dropped from 94 to 91%.  Returned to chair with Oxygen at 90%,  Returned to 93 after resting.  Transfers  Transfer: Yes  Transfer 1  Transfer From 1: Sit to  Transfer to 1: Stand  Technique 1: Sit to stand, Stand to sit  Transfer Level of Assistance 1: Close supervision  Trials/Comments 1: from Bed, from Toilet, Chair  Transfers 2  Transfer From 2: Stand to, Sit to  Transfer to 2: Toilet, Chair with arms  Technique 2: Sit to stand, Stand to sit  Transfer Level of Assistance 2: Close supervision, Minimal verbal cues     Outcome Measures:  [facility] Basic Mobility  Turning from your back to your side while in a flat bed without using bedrails: A little  Moving from lying on your back to sitting on the side of a flat bed without using bedrails: A lot  Moving to and from bed to chair (including a wheelchair): A little  Standing up from a chair using your arms (e.g. wheelchair or bedside chair): A little  To walk in hospital room: A little  Climbing 3-5 steps with railing: A little  Basic Mobility - Total Score: 17    Encounter Problems       Encounter Problems (Active)       Mobility       LTG -  Patient will ambulate community distance 200 to 300 feet with [place]       Start:  03/11/25    Expected End:  03/25/25            STG - Patient will ascend and descend a flight of stairs 1 rail with close supervision       Start:  03/11/25    Expected End:  03/25/25               PT Transfers       LTG - Patient will demonstrate safe transfer techniques using protective splinting of the left side of the rib cage.       Start:  03/11/25    Expected End:  03/25/25            STG - Patient to transfer to and from sit to supine min a to [place]       Start:  03/11/25    Expected End:  03/25/25            STG - Patient will perform bed mobility       Start:  03/11/25    Expected End:  03/25/25            STG - Patient will transfer sit to and from stand [place]       Start:  03/11/25    Expected End:  03/25/25               Pain          Pain - Adult

## 2025-03-11 NOTE — NURSING NOTE
Vss. Patient continues to have pain to LUQ. Pain medication administered per order and patients request. Therapy worked with patient today. Patient sat up in chair most of the day then back to bed to rest with call light in reach.

## 2025-03-11 NOTE — PROGRESS NOTES
03/11/25 1432   Discharge Planning   Living Arrangements Spouse/significant other   Support Systems Spouse/significant other   Assistance Needed A&Ox3. Was at the [facility] for a weekend getaway when she fell. Independent with ADL's and iADL's. Denies the use of assistive devices. Does drive and is retired. Normally room air, currently on 2L O2. Denies the need for assistance upon discharge.   Type of Residence Private residence   Number of Stairs to Enter Residence 2   Number of Stairs Within Residence 12   Do you have animals or pets at home? No   Who is requesting discharge planning? Provider   Home or Post Acute Services Post acute facilities (Rehab/SNF/etc)   Type of Post Acute Facility Services Rehab   Expected Discharge Disposition Home  (Low intensity recommendation from PT/OT. Patient would like to do outpatient PT and OT. Will need a script at discharge.)   Financial Resource Strain   How hard is it for you to pay for the very basics like food, housing, medical care, and heating? Not hard   Housing Stability   In the last 12 months, was there a time when you were not able to pay the mortgage or rent on time? N   In the past 12 months, how many times have you moved where you were living? 1   At any time in the past 12 months, were you homeless or living in a shelter (including now)? N   Transportation Needs   In the past 12 months, has lack of transportation kept you from medical appointments or from getting medications? no   In the past 12 months, has lack of transportation kept you from meetings, work, or from getting things needed for daily living? No

## 2025-03-11 NOTE — PROGRESS NOTES
Occupational Therapy    Evaluation/Treatment    Patient Name: Kira Nino  MRN: [id number]  Department: [facility]  Room: 228[facility]  Today's Date: 03/11/25  Time Calculation  Start Time: 0854  Stop Time: 0933  Time Calculation (min): 39 min     Assessment:  OT Assessment: Pt is a 70 yo F referred to occupational therapy for impaired self-care and functional mobility 2/2 hospitalization for fall and multiple rib fxs, L side. Pt demonstrates decreased endurance and functional mobility this date. Pt completed STS from bed/chair/toilet w/ close supervision and required min-mod A for bed mobility to decrease pain. Pt also completed functional mobility w/ close supervision. Pt would benefit from continued OT services at the LOW intensity level to increase functional independence and help w/ return to PLOF.  Prognosis: Good  Barriers to Discharge Home: No anticipated barriers  Evaluation/Treatment Tolerance: Patient tolerated treatment well, Patient limited by pain  Medical Staff Made Aware: Yes  End of Session Communication: Bedside nurse  End of Session Patient Position: Up in chair, Alarm on (no O2 at end of session, RN notified)  OT Assessment Results: Decreased endurance, Decreased functional mobility, Decreased IADLs  Prognosis: Good  Barriers to Discharge: None  Evaluation/Treatment Tolerance: Patient tolerated treatment well, Patient limited by pain  Medical Staff Made Aware: Yes  Strengths: Ability to acquire knowledge, Attitude of self  Barriers to Participation:  (n/a)  Plan:  Treatment Interventions: ADL retraining, Functional transfer training, UE strengthening/ROM, Endurance training, Equipment evaluation/education, Compensatory technique education  OT Frequency: 2 times per week  OT Discharge Recommendations: Low intensity level of continued care  OT Recommended Transfer Status: Stand by assist, Assist of 1  OT - OK to Discharge: Yes (Based on completed evaluation and care plan recommendations, no barriers to  discharge to next site of care)  Treatment Interventions: ADL retraining, Functional transfer training, UE strengthening/ROM, Endurance training, Equipment evaluation/education, Compensatory technique education    Subjective   Current Problem:  1. Fall, initial encounter        2. Closed fracture of multiple ribs of left side, initial encounter  DISCONTINUED: HYDROcodone-acetaminophen (Norco) 5-325 mg tablet    DISCONTINUED: lidocaine (Lidoderm) 5 % patch        General:   OT Received On: 03/11/25  General  Reason for Referral: Pt is a 68 yo F referred to occupational therapy for impaired self-care and functional mobility 2/2 hospitalization for multiple fx of ribs L side  Referred By: Madelyn Lovell PA-C  Past Medical History Relevant to Rehab: HTN , depression  Family/Caregiver Present: No  Prior to Session Communication: Bedside nurse  Patient Position Received: Bed, 2 rail up, Alarm off, not on at start of session  Preferred Learning Style: verbal, visual  General Comment: Pt pleasant and agreeable to therapy evaluation. Pt cleared by RN prior to session.   Precautions:  Hearing/Visual Limitations: glasses  Medical Precautions: Fall precautions, Oxygen therapy device and L/min  Precautions Comment: tele, masimo, O2 NC    Vital Signs Comment: SpO2 level decreased to 90% when ambulating w/o O2. Increase to 93% w/ deep breathing seated in chair. RN notified.     Pain:  Pain Assessment  Pain Assessment: 0-10  0-10 (Numeric) Pain Score: 3  Pain Type: Acute pain  Pain Location: Rib cage  Pain Orientation: Left  Pain Descriptors: Spasm  Pain Frequency: Intermittent  Pain Interventions: Compression, Repositioned, Ambulation/increased activity (compression by holding pillow to affected side)  Response to Interventions: Content/relaxed    Objective   Cognition:  Overall Cognitive Status: Within Functional Limits  Arousal/Alertness: Appropriate responses to stimuli  Orientation Level: Oriented X4    Home Living:  Type of  Home: House  Lives With: Spouse  Home Adaptive Equipment: None  Home Layout: Multi-level, Laundry in basement, 1/2 bath on main level, Bed/bath upstairs, Stairs to alternate level with rails (2 steps up to family room, 1 step up to kitchen)  Alternate Level Stairs-Rails: Right  Alternate Level Stairs-Number of Steps: 12  Home Access: Stairs to enter with rails  Bathroom Shower/Tub: Tub/shower unit, Walk-in shower (walk in shower in basement)  Bathroom Toilet: Standard  Bathroom Equipment: None  Prior Function:  Level of [place]: Independent with ADLs and functional transfers, Independent with homemaking with ambulation  Receives Help From: Family  ADL Assistance: Independent  Homemaking Assistance: Independent ( cooks, pt and  split cleaning and laundry)  Ambulatory Assistance: Independent (no AD)  Prior Function Comments: +drives    ADL:  Eating Assistance: Independent  Grooming Assistance: Independent  Bathing Assistance: Minimal  UE Dressing Assistance: Stand by  LE Dressing Assistance: Minimal  Toileting Assistance with Device: Stand by  Toileting Deficit: Perineal hygiene  Functional Assistance: Stand by  ADL Comments: Pt competed bed mobility w/ min-mod A and STS from bed and toilet w/ close supervision. Pt completed functional mobility w/ close supervision. Pt also completed grooming task standing at sink w/ close sup. Other ADLs anticipated.  Activities of Daily Living:  Grooming  Grooming Level of Assistance: Setup  Grooming Where Assessed: Standing sinkside  Grooming Comments: Pt washed hands w/ close supervision standing at sink, no LOB observed    Toileting  Toileting Level of Assistance: Close supervision  Where Assessed: Toilet  Toileting Comments: Education provided on toileting to prevent twisting of abdomen to reduce pain    Activity Tolerance:  Endurance: Tolerates less than 10 min exercise with changes in vital signs  Bed Mobility/Transfers:   Bed Mobility  Bed Mobility:  Yes  Bed Mobility 1  Bed Mobility 1: Rolling right, Rolling left  Level of Assistance 1: Minimum assistance  Bed Mobility Comments 1: Pt used pillow to compress ribs during rolling.  Bed Mobility 2  Bed Mobility  2: Supine to sitting, Side lying right to sit  Level of Assistance 2: Moderate assistance  Bed Mobility Comments 2: Pt limited by pain during transfer, pt education provided on proper log rolling technique  Bed Mobility 3  Bed Mobility 3: Scooting  Level of Assistance 3: Minimum assistance  Bed Mobility Comments 3: Assist to bring BLEs fully off of bed    Transfers  Transfer: Yes  Transfer 1  Transfer From 1: Bed to  Transfer to 1: Stand  Technique 1: Sit to stand, Stand to sit  Transfer Level of Assistance 1: Close supervision  Transfers 2  Transfer From 2: Toilet to  Transfer to 2: Stand  Technique 2: Sit to stand, Stand to sit  Transfer Level of Assistance 2: Close supervision  Trials/Comments 2: Education on use of grab bars for controlled lower to prevent pain  Transfers 3  Transfer to 3: Stand  Technique 3: Stand to sit, Sit to stand  Transfer Level of Assistance 3: Close supervision    Functional Mobility:  Functional Mobility  Functional Mobility Performed: Yes  Functional Mobility 1  Surface 1: Level tile  Device 1: No device  Assistance 1: Close supervision  Comments 1: Pt completed functional mobility in room and hallway w/ close supervision and no device. Pt w/ intermittent pain in ribs requiring rest breaks  Sitting Balance:  Static Sitting Balance  Static Sitting-Balance Support: Feet supported  Static Sitting-Level of Assistance: Independent  Dynamic Sitting Balance  Dynamic Sitting-Balance Support: Feet supported  Dynamic Sitting-Level of Assistance: Independent  Standing Balance:  Static Standing Balance  Static Standing-Balance Support: No upper extremity supported  Static Standing-Level of Assistance: Close supervision  Dynamic Standing Balance  Dynamic Standing-Balance Support: No upper  extremity supported  Dynamic Standing-Level of Assistance: Close supervision    Sensation:  Light Touch: No apparent deficits  Strength:  Strength Comments: BUE grossly 4/5  Coordination:  Movements are Fluid and Coordinated: Yes   Hand Function:  Hand Function  Gross Grasp: Functional  Coordination: Functional  Extremities:   RUE: Within Functional Limits   LUE: Within Functional Limits    Outcome Measures:   [facility] Daily Activity  Putting on and taking off regular lower body clothing: A little  Bathing (including washing, rinsing, drying): A little  Putting on and taking off regular upper body clothing: A little  Toileting, which includes using toilet, bedpan or urinal: None  Taking care of personal grooming such as brushing teeth: A little  Eating Meals: None  Daily Activity - Total Score: 20    Education Documentation  Body Mechanics, taught by Shy Sims OT at 3/11/2025 12:11 PM.  Learner: Patient  Readiness: Acceptance  Method: Explanation  Response: Verbalizes Understanding  Comment: Pt educated on POC, DC recs, safety and body mechanics when completing transfers and ADLs    Precautions, taught by Shy Sims OT at 3/11/2025 12:11 PM.  Learner: Patient  Readiness: Acceptance  Method: Explanation  Response: Verbalizes Understanding  Comment: Pt educated on POC, DC recs, safety and body mechanics when completing transfers and ADLs    ADL Training, taught by Shy Sims OT at 3/11/2025 12:11 PM.  Learner: Patient  Readiness: Acceptance  Method: Explanation  Response: Verbalizes Understanding  Comment: Pt educated on POC, DC recs, safety and body mechanics when completing transfers and ADLs    Goals:  Encounter Problems       Encounter Problems (Active)       ADLs       Patient will perform UB/LB bathing with set-up level of assistance and PRN bathroom equipment.       Start:  03/11/25    Expected End:  03/25/25            Patient with complete upper body dressing with set-up level of assistance  donning and doffing all UE clothes with PRN adaptive equipment while edge of bed or supported sitting        Start:  03/11/25    Expected End:  03/25/25            Patient with complete lower body dressing with set-up level of assistance donning and doffing all LE clothes  with PRN adaptive equipment while edge of bed  and standing       Start:  03/11/25    Expected End:  03/25/25            Patient will complete toileting including hygiene clothing management/hygiene with supervision level of assistance and PRN bathroom equipment.       Start:  03/11/25    Expected End:  03/25/25               BALANCE       Pt will maintain dynamic standing balance during ADL task with supervision level of assistance in order to demonstrate decreased risk of falling and improved postural control.       Start:  03/11/25    Expected End:  03/25/25               MOBILITY       Patient will perform Functional mobility mod  Household distances/Community Distances with supervision level of assistance and least restrictive device in order to improve safety and functional mobility.       Start:  03/11/25    Expected End:  03/25/25

## 2025-03-11 NOTE — PROGRESS NOTES
Pharmacy Medication History Review    Kira Nino is a 69 y.o. female admitted for Multiple fractures of ribs, left side, initial encounter for closed fracture. Pharmacy reviewed the patient's [id number] medications and allergies for accuracy.    The list below reflectives the updated PTA list. Please review each medication in order reconciliation for additional clarification and justification.  Medications Prior to Admission   Medication Sig Dispense Refill Last Dose/Taking    amLODIPine (Norvasc) 2.5 mg tablet Take 1 tablet (2.5 mg) by mouth once daily.   3/10/2025 Morning    cholecalciferol (Vitamin D-3) 25 MCG (1000 UT) capsule Take 1 capsule (25 mcg) by mouth once daily.   3/10/2025 Morning    cyanocobalamin (Vitamin B-12) 500 mcg tablet Take 1 tablet (500 mcg) by mouth once daily.   3/10/2025 Morning    sertraline (Zoloft) 50 mg tablet Take 1 tablet (50 mg) by mouth once daily.   3/10/2025 Morning        The list below reflectives the updated allergy list. Please review each documented allergy for additional clarification and justification.  Allergies  Reviewed by Natalia Saxena RN on 3/10/2025   No Known Allergies         Below are additional concerns with the patient's PTA list.  Pharmacy virtually reviewed medications using Medication Dispense History (MD). Pharmacy virtual review is accurate with nursing medication history, therefore patient not interviewed. Unable to verify OTCs.     Jaqui Stock CPhT  Medication History Pharmacy Technician  DIANE 8-4:30  Available via [employer] Secure Chat  OR  [phone]

## 2025-03-11 NOTE — PROGRESS NOTES
03/11/25 0924   Discharge Planning   Living Arrangements Spouse/significant other   Support Systems Spouse/significant other   Assistance Needed A&Ox3. Was at the [facility] for a weekend getaway when she fell. Independent with ADL's and iADL's. Denies the use of assistive devices. Does drive and is retired. Normally room air, currently on 2L O2. Denies the need for assistance upon discharge. PT/OT to evaluate due to fall.   Type of Residence Private residence   Number of Stairs to Enter Residence 2   Number of Stairs Within Residence 12   Do you have animals or pets at home? No   Who is requesting discharge planning? Provider   Home or Post Acute Services None   Expected Discharge Disposition Home   Does the patient need discharge transport arranged? No  ( to )   Financial Resource Strain   How hard is it for you to pay for the very basics like food, housing, medical care, and heating? Not hard   Housing Stability   In the last 12 months, was there a time when you were not able to pay the mortgage or rent on time? N   In the past 12 months, how many times have you moved where you were living? 1   At any time in the past 12 months, were you homeless or living in a shelter (including now)? N   Transportation Needs   In the past 12 months, has lack of transportation kept you from medical appointments or from getting medications? no   In the past 12 months, has lack of transportation kept you from meetings, work, or from getting things needed for daily living? No   Stroke Family Assessment   Stroke Family Assessment Needed No   Intensity of Service   Intensity of Service 0-30 min

## 2025-03-11 NOTE — H&P
History Of Present Illness  Kira Nino is a 69 y.o. female presenting with rib pain s/p mechanical fall. Patient is from out of town and is currently here visiting the [facility] for a vacation with family. She had a mechanical fall while getting out of the shower last night and landed on her left side. She did not hit her head or lose consciousness. She denies any preceding dizziness or palpitations. She came to the ED due to left rib/chest pain. She required 2L O2 via NC. Labs were unremarkable. CT chest showed nondisplaced fractures of the left 10th through 12th ribs and a mild pulmonary contusion versus atelectasis. ED provider discussed the patient with cardiothoracic surgery (Dr. Juarez); no indication for surgical intervention at this time. Patient was admitted to med/surg for further care.      Past Medical History  Past Medical History:   Diagnosis Date    Encounter for gynecological examination (general) (routine) without abnormal findings 2019    Visit for routine gyn exam    Encounter for screening for other viral diseases 2019    Need for hepatitis C screening test    Encounter for screening mammogram for malignant neoplasm of breast 2019    Other screening mammogram    Medial epicondylitis, unspecified elbow 2019    Medial epicondylitis of elbow    Other hypertrophic disorders of the skin 2018    Cutaneous skin tags    Other specified abnormal findings of blood chemistry 2019    Elevated LFTs    Pain in right elbow 2019    Right elbow pain    Personal history of diseases of the skin and subcutaneous tissue 2018    History of seborrheic keratosis    Personal history of other diseases of the circulatory system 2019    History of hypertension    Personal history of other specified conditions 2019    History of abnormal mammogram       Surgical History  Past Surgical History:   Procedure Laterality Date     SECTION, CLASSIC  2017  "    Section    DILATION AND CURETTAGE OF UTERUS  2017    Dilation And Curettage    TONSILLECTOMY  2017    Tonsillectomy    TOTAL ABDOMINAL HYSTERECTOMY  2019    Total Abdominal Hysterectomy        Social History  She reports that she has never smoked. She has never used smokeless tobacco. She reports current alcohol use. No history on file for drug use.    Family History  No family history on file.     Allergies  Patient has no known allergies.    Review of Systems   Constitutional:  Negative for chills and fever.   Cardiovascular:  Positive for chest pain.   Gastrointestinal:  Negative for abdominal pain, nausea and vomiting.   Genitourinary:  Negative for difficulty urinating and dysuria.   Neurological:  Negative for dizziness, syncope, weakness and light-headedness.   All other systems reviewed and are negative.       Physical Exam  Constitutional:       General: She is not in acute distress.     Appearance: She is obese. She is not toxic-appearing.   HENT:      Head: Normocephalic and atraumatic.      Mouth/Throat:      Mouth: Mucous membranes are moist.   Eyes:      Conjunctiva/sclera: Conjunctivae normal.   Cardiovascular:      Rate and Rhythm: Normal rate and regular rhythm.   Pulmonary:      Effort: No respiratory distress.      Comments: Diminished breath sounds, on room air with SpO2 90-93%  Abdominal:      General: There is no distension.      Palpations: Abdomen is soft.      Tenderness: There is no abdominal tenderness.   Musculoskeletal:         General: No swelling.   Skin:     General: Skin is warm and dry.   Neurological:      Mental Status: She is alert and oriented to person, place, and time.   Psychiatric:         Mood and Affect: Mood normal.         Behavior: Behavior normal.          Last Recorded Vitals  Blood pressure 127/75, pulse 85, temperature 36.4 °C (97.5 °F), temperature source Temporal, resp. rate 17, height 1.651 m (5' 5\"), weight 88.5 kg (195 lb), SpO2 " 94%.    Relevant Results        Scheduled medications  acetaminophen, 975 mg, oral, q8h  amLODIPine, 2.5 mg, oral, Daily  cholecalciferol, 1,000 Units, oral, Daily  cyanocobalamin, 500 mcg, oral, Daily  enoxaparin, 40 mg, subcutaneous, q24h  lidocaine, 1 patch, transdermal, Daily  ondansetron, 4 mg, intravenous, Once  oxygen, , inhalation, Continuous - Inhalation  pantoprazole, 40 mg, oral, Daily before breakfast  sertraline, 50 mg, oral, Daily      Continuous medications     PRN medications  PRN medications: benzocaine-menthol, dextromethorphan-guaifenesin, guaiFENesin, ondansetron, oxyCODONE, polyethylene glycol, tiZANidine    Results for orders placed or performed during the hospital encounter of 03/10/25 (from the past 24 hours)   ECG 12 lead   Result Value Ref Range    Ventricular Rate 86 BPM    Atrial Rate 86 BPM    MA Interval 142 ms    QRS Duration 86 ms    QT Interval 386 ms    QTC Calculation(Bazett) 461 ms    P Axis 33 degrees    R Axis 20 degrees    T Axis 38 degrees    QRS Count 14 beats    Q Onset 221 ms    P Onset 150 ms    P Offset 202 ms    T Offset 414 ms    QTC Fredericia 435 ms   CBC and Auto Differential   Result Value Ref Range    WBC 8.9 4.4 - 11.3 x10*3/uL    nRBC 0.0 0.0 - 0.0 /100 WBCs    RBC 4.80 4.00 - 5.20 x10*6/uL    Hemoglobin 15.2 12.0 - 16.0 g/dL    Hematocrit 45.3 36.0 - 46.0 %    MCV 94 80 - 100 fL    MCH 31.7 26.0 - 34.0 pg    MCHC 33.6 32.0 - 36.0 g/dL    RDW 12.5 11.5 - 14.5 %    Platelets 203 150 - 450 x10*3/uL    Neutrophils % 69.2 40.0 - 80.0 %    Immature Granulocytes %, Automated 0.2 0.0 - 0.9 %    Lymphocytes % 22.8 13.0 - 44.0 %    Monocytes % 6.7 2.0 - 10.0 %    Eosinophils % 0.3 0.0 - 6.0 %    Basophils % 0.8 0.0 - 2.0 %    Neutrophils Absolute 6.16 1.20 - 7.70 x10*3/uL    Immature Granulocytes Absolute, Automated 0.02 0.00 - 0.70 x10*3/uL    Lymphocytes Absolute 2.03 1.20 - 4.80 x10*3/uL    Monocytes Absolute 0.60 0.10 - 1.00 x10*3/uL    Eosinophils Absolute 0.03 0.00  - 0.70 x10*3/uL    Basophils Absolute 0.07 0.00 - 0.10 x10*3/uL   Comprehensive metabolic panel   Result Value Ref Range    Glucose 116 (H) 74 - 99 mg/dL    Sodium 136 136 - 145 mmol/L    Potassium 4.2 3.5 - 5.3 mmol/L    Chloride 99 98 - 107 mmol/L    Bicarbonate 25 21 - 32 mmol/L    Anion Gap 16 10 - 20 mmol/L    Urea Nitrogen 10 6 - 23 mg/dL    Creatinine 0.68 0.50 - 1.05 mg/dL    eGFR >90 >60 mL/min/1.73m*2    Calcium 10.2 8.6 - 10.3 mg/dL    Albumin 4.6 3.4 - 5.0 g/dL    Alkaline Phosphatase 106 33 - 136 U/L    Total Protein 7.6 6.4 - 8.2 g/dL    AST 30 9 - 39 U/L    Bilirubin, Total 1.0 0.0 - 1.2 mg/dL    ALT 44 7 - 45 U/L   Troponin I, High Sensitivity   Result Value Ref Range    Troponin I, High Sensitivity 5 0 - 13 ng/L   CBC   Result Value Ref Range    WBC 7.2 4.4 - 11.3 x10*3/uL    nRBC 0.0 0.0 - 0.0 /100 WBCs    RBC 4.43 4.00 - 5.20 x10*6/uL    Hemoglobin 13.8 12.0 - 16.0 g/dL    Hematocrit 42.1 36.0 - 46.0 %    MCV 95 80 - 100 fL    MCH 31.2 26.0 - 34.0 pg    MCHC 32.8 32.0 - 36.0 g/dL    RDW 12.9 11.5 - 14.5 %    Platelets 185 150 - 450 x10*3/uL   Basic metabolic panel   Result Value Ref Range    Glucose 114 (H) 74 - 99 mg/dL    Sodium 139 136 - 145 mmol/L    Potassium 3.8 3.5 - 5.3 mmol/L    Chloride 103 98 - 107 mmol/L    Bicarbonate 29 21 - 32 mmol/L    Anion Gap 11 10 - 20 mmol/L    Urea Nitrogen 13 6 - 23 mg/dL    Creatinine 0.75 0.50 - 1.05 mg/dL    eGFR 87 >60 mL/min/1.73m*2    Calcium 9.7 8.6 - 10.3 mg/dL     ECG 12 lead    Result Date: 3/10/2025  Normal sinus rhythm Normal ECG No previous ECGs available See ED provider note for full interpretation and clinical correlation Confirmed by Carolina Conde ([id number]) on 3/10/2025 10:06:11 PM    CT chest wo IV contrast    Result Date: 3/10/2025  Interpreted By:  Kelley Gil, STUDY: CT CHEST WO IV CONTRAST;  3/10/2025 1:34 pm   INDICATION: Signs/Symptoms:hypoxia, left rib fractures after fall.     COMPARISON: None.   ACCESSION NUMBER(S):  [id number]   ORDERING CLINICIAN: ELVIA PARSONS   TECHNIQUE: Helical data acquisition of the chest was obtained  without IV contrast material.  Images were reformatted in axial, coronal, and sagittal planes.   FINDINGS: LUNGS AND AIRWAYS: The trachea and central airways are patent. No endobronchial lesion.   There is elevation of the left diaphragmatic dome. There is mild pleural thickening with subpleural airspace opacity along the lateral aspect of the left lower lobe just adjacent to the below mentioned rib fractures. Otherwise rest of the lungs are clear. There is trace left pleural effusion. No suspicious pulmonary nodules.   MEDIASTINUM AND SU, LOWER NECK AND AXILLA: The visualized thyroid gland is within normal limits.   No evidence of thoracic lymphadenopathy by CT criteria.   Esophagus appears within normal limits as seen.   HEART AND VESSELS: The thoracic aorta is of normal course and caliber. There are mild atherosclerotic calcifications in the arch of thoracic aorta.   Main pulmonary artery and its branches are normal in caliber.   No coronary artery calcifications are seen. The study is not optimized for evaluation of coronary arteries.   The cardiac chambers are not enlarged.   No evidence of pericardial effusion.   UPPER ABDOMEN: The visualized subdiaphragmatic structures demonstrate no remarkable findings.   CHEST WALL AND OSSEOUS STRUCTURES: There are nondisplaced fractures through posterolateral aspect of left 10th through 12th ribs.       1. Nondisplaced fractures through left posterolateral aspect of left 10th through 12th ribs. 2. Trace left pleural effusion with mild pleural thickening and subpleural airspace opacity along the lateral aspect of left lower lobe adjacent to the left thoracic rib fractures. This could be related to posttraumatic mild pulmonary contusion versus atelectasis.   MACRO: None   Signed by: Kelley Gil 3/10/2025 1:52 PM Dictation workstation:   [id number]    XR  ribs 2 views left w chest pa or ap    Result Date: 3/10/2025  Interpreted By:  Lyssa Paulino, STUDY: XR RIBS 2 VIEWS LEFT WITH CHEST PA OR AP;  3/10/2025 12:30 pm   INDICATION: Signs/Symptoms:injury.     COMPARISON: None.   ACCESSION NUMBER(S): [id number]   ORDERING CLINICIAN: ASHLEY DUMONT   FINDINGS: Frontal view of the chest and four views of the left ribs obtained.   Skin marker near the level of the lateral 8th rib. Minimally displaced fractures of the lateral 7th 8th and 9th ribs.   Left basilar opacity with blunting of the costophrenic angle. No pneumothorax. The right lung is clear. The cardiac silhouette is within normal limits for size.       Minimally displaced fractures of the left 7th through 9th ribs and left basilar infiltrate and/or pleural effusion.   MACRO: None.   Signed by: Lyssa Paulino 3/10/2025 12:40 PM Dictation workstation:   [id number]        Assessment/Plan   Assessment & Plan  Multiple fractures of ribs, left side, initial encounter for closed fracture    Fall    Hypoxia    HTN (hypertension)    Depression    Vitamin D deficiency      #Rib fractures of left side, nondisplaced  #Mechanical fall  #Hypoxia, improving   - Mechanical fall onto left side getting out of shower; no loss of consciousness or head injury per patient  - Required 2L acutely in ED  - CT chest: Nondisplaced fractures through left posterolateral aspect of left   10th through 12th ribs. 2. Trace left pleural effusion with mild pleural thickening and   subpleural airspace opacity along the lateral aspect of left lower   lobe adjacent to the left thoracic rib fractures. This could be   related to posttraumatic mild pulmonary contusion versus atelectasis.   - Imaging reviewed by CT surgery; no indication for transfer at this time  - Tylenol 975 mg q8h scheduled  - Lidocaine patch ordered  - Oxycodone 5 mg q6h PRN for severe pain  - Tizanidine 4 mg q8h PRN for muscle spasms   - Monitor SpO2 continuously  - Incentive  spirometry ordered   - PT/OT evals  - Ambulatory pulse ox before discharge     #HTN  - Trop 5  - Continue amlodipine 2.5 mg every day  - Monitor HR and BP     #Depression   - Continue sertraline 50 mg every day    #Vitamin D deficiency  - Continue cholecalciferol 1000 units every day    DVT PPx: Lovenox  GI PPx: Protonix   Diet: Regular   Code Status: Full     Disposition: Patient requires inpatient management at this time.     Patient seen/evaluated and plan of care discussed with attending Dr. Mary Perez.        Madelyn Lovell PA-C  Attending Attestation:    Patient was seen and examined face to face, history and physical was taken personally at bedside the KASI-CNP, was present for the whole duration of the exam who participated in the documentation of this note. I performed the medical decision-making components (assessment and plan of care). I have reviewed the documentation and verified the findings in the note as written with additions or exceptions as stated in the body of this note.   Status post mechanical fall, and fracture of 10-12 left-sided rib, CT scan showing slight pleural effusion and possible pulmonary contusion.  Patient has been having shortness of breath, on 2 L of oxygen, this morning she was taking off of oxygen.  Still no significant shortness of breath but pulse ox dropped to 91.  She has pain with deep breathing, there is some mild crackles on left side of the lungs, heart is regular, abdomen soft bowel sounds are present extremity no edema.  Patient with rib fracture, pulmonary contusion, we will keep patient in hospital monitoring, if respiratory condition get worse we will repeat CT scan, continue with pain medication, Tylenol 1 g around-the-clock 3 times a day, and Oxy as needed, will start patient also on muscle relaxer.  DVT prophylaxis.    Dr. Mary Perez MD  Internal Medicine

## 2025-03-11 NOTE — DISCHARGE INSTR - OTHER ORDERS
Thank you for choosing [facility] for your Health Care needs.  Also, thank you for allowing us to take you and your families preferences into account when determining your discharge plan.  Stay well!                                    Your Care Transitions Team Member:  Jennifer Lam Robin and Mary Alice     For questions about your medications listed on your discharge instructions, please call the Nurses Station at [phone].   
No

## 2025-03-11 NOTE — ED PROVIDER NOTES
HPI   Chief Complaint   Patient presents with    Fall    Rib Injury     Pt states last night she tripped and fell, injuring her L rib when she fell onto the shower divider. Pt also hit her head and L elbow, no LOC, no blood thinners       History of present illness:  69-year-old female presents to the emergency room for complaints of left lower rib pain.  The patient states that she has been staying at a local hotel and she states that she slipped in shower last night and unfortunately fell on her left side.  She states she has had persistent pain in her left lower side since then states that she has not been able to take deep breath without being in pain and states that she is concerned that she may have damaged something.  She states that she has not been having any hemoptysis and denies any prior heart conditions.  She states that she has a past medical history of hypertension and depression.  She denies any abdominal pain at this time or change in bowel habits.  She denies hitting her head.    Social history: Negative for alcohol and drug use.    Review of systems:   Gen.: No weight loss, fatigue, anorexia, insomnia, fever.   Eyes: No vision loss, double vision, drainage, eye pain.   ENT: No pharyngitis, dry mouth.   Cardiac: No  palpitations, syncope, near syncope.   Pulmonary: No shortness of breath, cough, hemoptysis.   Heme/lymph: No swollen glands, fever, bleeding.   GI: No abdominal pain, change in bowel habits, melena, hematemesis, hematochezia, nausea, vomiting, diarrhea.   : No discharge, dysuria, frequency, urgency, hematuria.   Musculoskeletal: No limb pain, joint pain, joint swelling.   Skin: No rashes.   Review of systems is otherwise negative unless stated above or in history of present illness.        Physical exam:  General: Vitals noted, no distress. Afebrile.   EENT: Atraumatic, normocephalic scalp no lymphadenopathy appreciated,   Cardiac: Regular, rate, rhythm, no murmur.   Chest: Pain to  palpation over left lower chest wall as well as the posterior ribs on the left lower side.  Pulmonary: Lungs clear bilaterally with good aeration. No adventitious breath sounds.   Abdomen: Soft, nonsurgical. Nontender. No peritoneal signs. Normoactive bowel sounds.   Extremities: No peripheral edema.   Skin: No rash.   Neuro: No focal neurologic deficits      Medical decision making:   Testing: X-rays braces and hips ordered by nursing staff which show concerns for fractures and 789, possible pleural effusion present as well, CT scan of the chest without contrast shows nondisplaced fractures to ribs 10,11 and 12 on the left side and concerns for pulmonary contusion as well  Treatment: IV morphine IV Zofran given  Reevaluation: Patient was ambulated with pulse ox initially at rest patient's pulse ox was 93% but when the patient began to ambulate her oxygen dropped to 86% on room air.  She was placed on oxygen at this time.  Plan: 69-year-old female presents to the emergency room for complaints of left lower rib pain.  The patient states that she has been staying at a local hotel and she states that she slipped in shower last night and unfortunately fell on her left side.  She states she has had persistent pain in her left lower side since then states that she has not been able to take deep breath without being in pain and states that she is concerned that she may have damaged something.  She states that she has not been having any hemoptysis and denies any prior heart conditions.  She states that she has a past medical history of hypertension and depression.  She denies any abdominal pain at this time or change in bowel habits.  She denies hitting her head. Cardiac: Regular, rate, rhythm, no murmur.   Chest: Pain to palpation over left lower chest wall as well as the posterior ribs on the left lower side.  Pulmonary: Lungs clear bilaterally with good aeration. No adventitious breath sounds.   Abdomen: Soft,  nonsurgical. Nontender. No peritoneal signs. Normoactive bowel sounds.  I explained to the patient test results at this time and she was initially reluctant to being admitted but eventually relented once her pulse ox unfortunately dropped when she was ambulating.  The patient is agreeable to being admitted this time for further evaluation.  I spoke with Dr. Gillette of general surgery who reviewed the case remotely and explained to me did not feel the patient need to be transferred at this time for further evaluation or care under general surgery.  Impression:   1.  Rib fractures  2.  Pulmonary contusion          History provided by:  Patient   used: No            Patient History   Past Medical History:   Diagnosis Date    Encounter for gynecological examination (general) (routine) without abnormal findings 2019    Visit for routine gyn exam    Encounter for screening for other viral diseases 2019    Need for hepatitis C screening test    Encounter for screening mammogram for malignant neoplasm of breast 2019    Other screening mammogram    Medial epicondylitis, unspecified elbow 2019    Medial epicondylitis of elbow    Other hypertrophic disorders of the skin 2018    Cutaneous skin tags    Other specified abnormal findings of blood chemistry 2019    Elevated LFTs    Pain in right elbow 2019    Right elbow pain    Personal history of diseases of the skin and subcutaneous tissue 2018    History of seborrheic keratosis    Personal history of other diseases of the circulatory system 2019    History of hypertension    Personal history of other specified conditions 2019    History of abnormal mammogram     Past Surgical History:   Procedure Laterality Date     SECTION, CLASSIC  2017     Section    DILATION AND CURETTAGE OF UTERUS  2017    Dilation And Curettage    TONSILLECTOMY  2017    Tonsillectomy    TOTAL  ABDOMINAL HYSTERECTOMY  12/12/2019    Total Abdominal Hysterectomy     No family history on file.  Social History     Tobacco Use    Smoking status: Never    Smokeless tobacco: Never   Substance Use Topics    Alcohol use: Yes     Comment: occasional    Drug use: Not on file       Physical Exam   ED Triage Vitals   Temp Heart Rate Resp BP   03/10/25 1138 03/10/25 1138 03/10/25 1138 03/10/25 1138   36.7 °C (98 °F) 80 17 133/82      SpO2 Temp Source Heart Rate Source Patient Position   03/10/25 1138 03/10/25 1138 03/10/25 1554 03/10/25 1800   (!) 93 % Oral Monitor Lying      BP Location FiO2 (%)     03/10/25 1554 --     Left arm        Physical Exam      ED Course & MDM   Diagnoses as of 03/11/25 0014   Fall, initial encounter   Closed fracture of multiple ribs of left side, initial encounter                 No data recorded     Roland Coma Scale Score: 15 (03/10/25 1950 : Andra Chandler RN)                           Medical Decision Making      Procedure  Procedures     Jensen Sharif PA-C  03/11/25 0017

## 2025-03-12 ENCOUNTER — PHARMACY VISIT (OUTPATIENT)
Dept: PHARMACY | Facility: CLINIC | Age: 70
End: 2025-03-12
Payer: COMMERCIAL

## 2025-03-12 VITALS
SYSTOLIC BLOOD PRESSURE: 131 MMHG | WEIGHT: 195 LBS | HEIGHT: 65 IN | DIASTOLIC BLOOD PRESSURE: 75 MMHG | HEART RATE: 75 BPM | TEMPERATURE: 97.7 F | BODY MASS INDEX: 32.49 KG/M2 | OXYGEN SATURATION: 93 % | RESPIRATION RATE: 16 BRPM

## 2025-03-12 LAB
ANION GAP SERPL CALC-SCNC: 9 MMOL/L (ref 10–20)
BUN SERPL-MCNC: 13 MG/DL (ref 6–23)
CALCIUM SERPL-MCNC: 9.9 MG/DL (ref 8.6–10.3)
CHLORIDE SERPL-SCNC: 103 MMOL/L (ref 98–107)
CO2 SERPL-SCNC: 31 MMOL/L (ref 21–32)
CREAT SERPL-MCNC: 0.79 MG/DL (ref 0.5–1.05)
EGFRCR SERPLBLD CKD-EPI 2021: 82 ML/MIN/1.73M*2
ERYTHROCYTE [DISTWIDTH] IN BLOOD BY AUTOMATED COUNT: 12.8 % (ref 11.5–14.5)
GLUCOSE SERPL-MCNC: 114 MG/DL (ref 74–99)
HCT VFR BLD AUTO: 40.8 % (ref 36–46)
HGB BLD-MCNC: 13.4 G/DL (ref 12–16)
MCH RBC QN AUTO: 31.7 PG (ref 26–34)
MCHC RBC AUTO-ENTMCNC: 32.8 G/DL (ref 32–36)
MCV RBC AUTO: 97 FL (ref 80–100)
NRBC BLD-RTO: 0 /100 WBCS (ref 0–0)
PLATELET # BLD AUTO: 170 X10*3/UL (ref 150–450)
POTASSIUM SERPL-SCNC: 4.3 MMOL/L (ref 3.5–5.3)
RBC # BLD AUTO: 4.23 X10*6/UL (ref 4–5.2)
SODIUM SERPL-SCNC: 139 MMOL/L (ref 136–145)
WBC # BLD AUTO: 7 X10*3/UL (ref 4.4–11.3)

## 2025-03-12 PROCEDURE — 2500000001 HC RX 250 WO HCPCS SELF ADMINISTERED DRUGS (ALT 637 FOR MEDICARE OP)

## 2025-03-12 PROCEDURE — 94761 N-INVAS EAR/PLS OXIMETRY MLT: CPT

## 2025-03-12 PROCEDURE — 99238 HOSP IP/OBS DSCHRG MGMT 30/<: CPT

## 2025-03-12 PROCEDURE — 2500000002 HC RX 250 W HCPCS SELF ADMINISTERED DRUGS (ALT 637 FOR MEDICARE OP, ALT 636 FOR OP/ED)

## 2025-03-12 PROCEDURE — 2500000005 HC RX 250 GENERAL PHARMACY W/O HCPCS: Performed by: NURSE PRACTITIONER

## 2025-03-12 PROCEDURE — 36415 COLL VENOUS BLD VENIPUNCTURE: CPT

## 2025-03-12 PROCEDURE — G0378 HOSPITAL OBSERVATION PER HR: HCPCS

## 2025-03-12 PROCEDURE — 85027 COMPLETE CBC AUTOMATED: CPT

## 2025-03-12 PROCEDURE — 97110 THERAPEUTIC EXERCISES: CPT | Mod: GP,CQ

## 2025-03-12 PROCEDURE — RXMED WILLOW AMBULATORY MEDICATION CHARGE

## 2025-03-12 PROCEDURE — 97116 GAIT TRAINING THERAPY: CPT | Mod: GP,CQ

## 2025-03-12 PROCEDURE — 97112 NEUROMUSCULAR REEDUCATION: CPT | Mod: GP,CQ

## 2025-03-12 PROCEDURE — 2500000001 HC RX 250 WO HCPCS SELF ADMINISTERED DRUGS (ALT 637 FOR MEDICARE OP): Performed by: NURSE PRACTITIONER

## 2025-03-12 PROCEDURE — 80048 BASIC METABOLIC PNL TOTAL CA: CPT

## 2025-03-12 RX ORDER — TIZANIDINE 4 MG/1
4 TABLET ORAL EVERY 8 HOURS PRN
Qty: 21 TABLET | Refills: 0 | Status: SHIPPED | OUTPATIENT
Start: 2025-03-12 | End: 2025-03-19

## 2025-03-12 RX ORDER — OXYCODONE HYDROCHLORIDE 5 MG/1
5 TABLET ORAL EVERY 6 HOURS PRN
Qty: 12 TABLET | Refills: 0 | Status: SHIPPED | OUTPATIENT
Start: 2025-03-12 | End: 2025-03-15

## 2025-03-12 RX ORDER — LIDOCAINE 560 MG/1
1 PATCH PERCUTANEOUS; TOPICAL; TRANSDERMAL DAILY
Qty: 15 PATCH | Refills: 0 | Status: SHIPPED | OUTPATIENT
Start: 2025-03-13

## 2025-03-12 RX ADMIN — LIDOCAINE 1 PATCH: 560 PATCH PERCUTANEOUS; TOPICAL; TRANSDERMAL at 10:24

## 2025-03-12 RX ADMIN — PANTOPRAZOLE SODIUM 40 MG: 40 TABLET, DELAYED RELEASE ORAL at 06:57

## 2025-03-12 RX ADMIN — ACETAMINOPHEN 975 MG: 325 TABLET, FILM COATED ORAL at 03:25

## 2025-03-12 RX ADMIN — VITAM B12 500 MCG: 100 TAB at 10:25

## 2025-03-12 RX ADMIN — Medication 1000 UNITS: at 10:25

## 2025-03-12 RX ADMIN — AMLODIPINE BESYLATE 2.5 MG: 2.5 TABLET ORAL at 10:28

## 2025-03-12 RX ADMIN — SERTRALINE HYDROCHLORIDE 50 MG: 50 TABLET ORAL at 10:25

## 2025-03-12 ASSESSMENT — PAIN - FUNCTIONAL ASSESSMENT
PAIN_FUNCTIONAL_ASSESSMENT: 0-10
PAIN_FUNCTIONAL_ASSESSMENT: 0-10

## 2025-03-12 ASSESSMENT — COGNITIVE AND FUNCTIONAL STATUS - GENERAL
DRESSING REGULAR LOWER BODY CLOTHING: A LITTLE
CLIMB 3 TO 5 STEPS WITH RAILING: A LITTLE
MOVING FROM LYING ON BACK TO SITTING ON SIDE OF FLAT BED WITH BEDRAILS: A LITTLE
MOVING TO AND FROM BED TO CHAIR: A LITTLE
CLIMB 3 TO 5 STEPS WITH RAILING: A LITTLE
DAILY ACTIVITIY SCORE: 20
MOBILITY SCORE: 20
TOILETING: A LITTLE
WALKING IN HOSPITAL ROOM: A LITTLE
TURNING FROM BACK TO SIDE WHILE IN FLAT BAD: A LITTLE
DRESSING REGULAR UPPER BODY CLOTHING: A LITTLE
MOBILITY SCORE: 22
HELP NEEDED FOR BATHING: A LITTLE

## 2025-03-12 ASSESSMENT — PAIN DESCRIPTION - ORIENTATION: ORIENTATION: LEFT

## 2025-03-12 ASSESSMENT — PAIN SCALES - GENERAL
PAINLEVEL_OUTOF10: 5 - MODERATE PAIN
PAINLEVEL_OUTOF10: 7

## 2025-03-12 ASSESSMENT — PAIN DESCRIPTION - LOCATION: LOCATION: RIB CAGE

## 2025-03-12 NOTE — PROGRESS NOTES
Physical Therapy    Physical Therapy Treatment    Patient Name: Kira Nino  MRN: [id number]  Department: [facility]  Room: [address] [place], [place] [address]  Today's Date: 3/12/2025  Time Calculation  Start Time: 0811  Stop Time: 0850  Time Calculation (min): 39 min         Assessment/Plan   PT Assessment  PT Assessment Results: Decreased endurance, Decreased mobility, Pain  Rehab Prognosis: Good  Barriers to Discharge Home: No anticipated barriers  Evaluation/Treatment Tolerance: Patient limited by pain  Medical Staff Made Aware: Yes  Strengths: Ability to acquire knowledge  Barriers to Participation: Comorbidities  End of Session Communication: Bedside nurse  Assessment Comment: Pt cont to report high pain levels during transitional movements but able to perform at MI with good ue sequecning and good cord awaareness.  Able to improve gait with and without O2 with SUP with improved stride lenghts post cues and up and down steps with SBA.  Pt able to tolerate minimal TE in standing this date with UE bracing ribs due to pain but able to perform all PT without hesitation. Pt cont to require skilled PT to improve endurance and prevent further decline while here in hospital. Pt left up in chair, alarm on. call [employer] reach and provider present when PTA left room.  End of Session Patient Position: Up in chair, Alarm on  PT Plan  Inpatient/Swing Bed or Outpatient: Inpatient  PT Plan  Treatment/Interventions: Transfer training, Gait training, Stair training, Balance training, Neuromuscular re-education, Strengthening, Endurance training, Range of motion, Therapeutic exercise, Therapeutic activity  PT Plan: Ongoing PT  PT Frequency: 4 times per week  PT Discharge Recommendations: Low intensity level of continued care  PT Recommended Transfer Status: Assist x1  PT - OK to Discharge: Yes      General Visit Information:   PT  Visit  PT Received On: 03/12/25  Response to Previous Treatment: Patient with no complaints from previous  session.  General  Reason for Referral: Multiple Rib Fx's, Lung Contusion S/P Fall  Referred By: Madelyn Lovell PA-C  Past Medical History Relevant to Rehab: HTN, Depression, Hyster  Family/Caregiver Present: No  Prior to Session Communication: Bedside nurse  Patient Position Received: Up in bathroom  Preferred Learning Style: visual, written  General Comment: Pt pleasant and agreeable to therapy this date and was cleared by nursing prior to session. Pt reports 7/10 pain in ribs during all transitional movements but not pain in standing or at rest sitting in certrain positions.    Subjective Sore and pain in ribs during movements but walking feels good and standing doesn't hurt.   Precautions:  Precautions  Hearing/Visual Limitations: Glasses  Medical Precautions: Fall precautions, Oxygen therapy device and L/min  Precautions Comment: [place], 1L O2.     Date/Time Vitals Session Patient Position Pulse Resp SpO2 BP MAP (mmHg)    03/12/25 0811 Pre PT  --  54  15  95 %  --  --                 Objective   Pain:  Pain Assessment  Pain Assessment: 0-10  0-10 (Numeric) Pain Score: 7  Pain Type: Acute pain  Pain Location: Rib cage  Pain Orientation: Left (during transitional movements.)  Pain Interventions: Repositioned (post PT)  Response to Interventions: Decrease in pain  Cognition:  Cognition  Overall Cognitive Status: Within Functional Limits  Arousal/Alertness: Appropriate responses to stimuli  Orientation Level: Oriented X4  Coordination:  Movements are Fluid and Coordinated: Yes  Postural Control:  Dynamic Standing Balance  Dynamic Standing-Balance Support: No upper extremity supported  Dynamic Standing-Level of Assistance: Modified independent  Dynamic Standing-Balance: Reaching for objects, Reaching across midline  Dynamic Standing-Comments: G- no LOB      Activity Tolerance:  Activity Tolerance  Endurance: Tolerates 30 min exercise with multiple rests  Treatments:  Therapeutic Exercise  Therapeutic Exercise  Performed: Yes  Therapeutic Exercise Activity 1: 2x15 HR  Therapeutic Exercise Activity 2: 2x15 side stepping 10ft each direction  Therapeutic Exercise Activity 3: 2x5 STS         Balance/Neuromuscular Re-Education  Balance/Neuromuscular Re-Education Activity Performed: Yes  Balance/Neuromuscular Re-Education Activity 1: wide AMRIK 3-4 min functional dynamic balance reaching outside amrik and across midline with no LOB   G- balance noted.         Ambulation/Gait Training  Ambulation/Gait Training Performed: Yes  Ambulation/Gait Training 1  Surface 1: Level tile  Device 1: No device  Gait Support Devices: Gait belt  Assistance 1: Close supervision  Quality of Gait 1: Decreased step length  Comments/Distance (ft) 1: 200-250 ft gait without AD with 1L O2 continuous with inconsistent stride lengths noted and some heistant steps due to pain but no unsafe lob and good contrl during turns and longer distance gait.  O2 levels maintained 90% or above  Ambulation/Gait Training 2  Surface 2: Level tile  Device 2: No device  Gait Support Devices: Gait belt  Assistance 2: Modified independent  Quality of Gait 2: Diminished heel strike, Inconsistent stride length, Decreased step length, Shuffling gait  Comments/Distance (ft) 2: 175-200 ft gait without O2 per Provider requesting PT to remove oxygen and walk while she is present to see what her numbers are trending without O2, CGA due to watching levels, no lob, good stride lengths and O2 maintained at or above 91% even with gait and talking during exertion.  PA notified.  Transfers  Transfer: Yes  Transfer 1  Transfer From 1: Chair with arms to  Transfer to 1: Stand  Technique 1: Sit to stand, Stand to sit  Transfer Device 1: Gait belt  Transfer Level of Assistance 1: Modified independent  Trials/Comments 1: increased time needed and careful ue and le placement due to rib pain with hand on ribs at all times no lob- multiple trials to and from different surfaces with arm  rests.    Stairs  Stairs: Yes  Stairs  Rails 1: Left  Curb Step 1: No  Device 1: No device  Support Devices 1: Gait belt  Assistance 1: Distant supervision  Comment/Number of Steps 1: 2  6 inch steps performed with L HR only to simulate  assist at home with alternating pattern up and step to pattern down, 2 trials with no lob, no A and good safety awareness and levels maintained for O2.         Outcome Measures:  [facility] Basic Mobility  Turning from your back to your side while in a flat bed without using bedrails: A little  Moving from lying on your back to sitting on the side of a flat bed without using bedrails: A little  Moving to and from bed to chair (including a wheelchair): A little  Standing up from a chair using your arms (e.g. wheelchair or bedside chair): None  To walk in hospital room: None  Climbing 3-5 steps with railing: A little  Basic Mobility - Total Score: 20    Education Documentation  Body Mechanics, taught by Veronica Rebollar PTA at 3/12/2025  9:42 AM.  Learner: Patient  Readiness: Acceptance  Method: Explanation  Response: Verbalizes Understanding  Comment: Education and review for safer b le stride lengths during gait and controlled steps up and down to reduce risk for falls and improve midline and posture control during turns.    Mobility Training, taught by Veronica Rebollar PTA at 3/12/2025  9:42 AM.  Learner: Patient  Readiness: Acceptance  Method: Explanation  Response: Verbalizes Understanding  Comment: Education and review for safer b le stride lengths during gait and controlled steps up and down to reduce risk for falls and improve midline and posture control during turns.    Education Comments  No comments found.        Encounter Problems       Encounter Problems (Active)       Mobility       LTG - Patient will ambulate community distance 200 to 300 feet with [place] (Progressing)       Start:  03/11/25    Expected End:  03/25/25            STG - Patient will  ascend and descend a flight of stairs 1 rail with close supervision (Progressing)       Start:  03/11/25    Expected End:  03/25/25               PT Transfers       LTG - Patient will demonstrate safe transfer techniques using protective splinting of the left side of the rib cage. (Progressing)       Start:  03/11/25    Expected End:  03/25/25            STG - Patient to transfer to and from sit to supine min a to [place] (Progressing)       Start:  03/11/25    Expected End:  03/25/25            STG - Patient will perform bed mobility (Progressing)       Start:  03/11/25    Expected End:  03/25/25            STG - Patient will transfer sit to and from stand [place] (Progressing)       Start:  03/11/25    Expected End:  03/25/25               Pain          Pain - Adult

## 2025-03-12 NOTE — CARE PLAN
Problem: Discharge Planning  Goal: Discharge to home or other facility with appropriate resources  Outcome: Progressing     Problem: Chronic Conditions and Co-morbidities  Goal: Patient's chronic conditions and co-morbidity symptoms are monitored and maintained or improved  Outcome: Progressing     Problem: Respiratory  Goal: Minimal/no exertional discomfort or dyspnea this shift  Outcome: Progressing  Goal: No signs of respiratory distress (eg. Use of accessory muscles. Peds grunting)  Outcome: Progressing     Problem: Pain  Goal: LTG - Patient will demonstrate intervention for managing pain.  Protective splinting.  splinting with pillow. Practice breathing.  Outcome: Progressing     Problem: Pain - Adult  Goal: Verbalizes/displays adequate comfort level or baseline comfort level  Outcome: Met     Problem: Nutrition  Goal: Nutrient intake appropriate for maintaining nutritional needs  Outcome: Met     Problem: Respiratory  Goal: Clear secretions with interventions this shift  Outcome: Met  Goal: Minimize anxiety/maximize coping throughout shift  Outcome: Met  Goal: Patent airway maintained this shift  Outcome: Met   The patient's goals for the shift include      The clinical goals for the shift include Pt will have pain controlled this shift.    Over the shift, the patient did  make progress toward the following goals.

## 2025-03-12 NOTE — CARE PLAN
Patient evaluated for home going oxygen.    Patient at rest in room air was 94%, HR 71  Patient was walked in room air 93%, HR 89

## 2025-03-12 NOTE — PROGRESS NOTES
03/12/25 1020   Discharge Planning   Expected Discharge Disposition Home  (Low intensity recommendation from PT/OT. Patient would like to do outpatient PT and OT. Will need a script at discharge.)   Intensity of Service   Intensity of Service 0-30 min

## 2025-03-12 NOTE — DISCHARGE SUMMARY
Discharge Diagnosis  Multiple fractures of ribs, left side, initial encounter for closed fracture    Issues Requiring Follow-Up    Follow up with your PCP for sleep study through [facility] (patient preference);  sleep medicine referral placed on discharge as well      Discharge Meds     Medication List      START taking these medications     Lidocaine Pain Relief 4 % patch; Generic drug: lidocaine; Place 1 patch   over 12 hours on the skin once daily. Remove & discard patch within 12   hours or as directed by MD.; Start taking on: March 13, 2025   oxyCODONE 5 mg immediate release tablet; Commonly known as: Roxicodone;   Take 1 tablet (5 mg) by mouth every 6 hours if needed for severe pain (7 -   10) for up to 3 days.   tiZANidine 4 mg tablet; Commonly known as: Zanaflex; Take 1 tablet (4   mg) by mouth every 8 hours if needed for muscle spasms for up to 7 days.     CONTINUE taking these medications     amLODIPine 2.5 mg tablet; Commonly known as: Norvasc   cholecalciferol 25 MCG (1000 UT) capsule; Commonly known as: Vitamin D-3   cyanocobalamin 500 mcg tablet; Commonly known as: Vitamin B-12   sertraline 50 mg tablet; Commonly known as: Zoloft       Test Results Pending At Discharge  Pending Labs       No current pending labs.          History Of Present Illness  Kira Nino is a 69 y.o. female presenting with rib pain s/p mechanical fall. Patient is from out of town and is currently here visiting the [facility] for a vacation with family. She had a mechanical fall while getting out of the shower last night and landed on her left side. She did not hit her head or lose consciousness. She denies any preceding dizziness or palpitations. She came to the ED due to left rib/chest pain. She required 2L O2 via [place]. Labs were unremarkable. CT chest showed nondisplaced fractures of the left 10th through 12th ribs and a mild pulmonary contusion versus atelectasis. ED provider discussed the patient with  cardiothoracic surgery (Dr. Juarez); no indication for surgical intervention at this time. Patient was admitted to med/surg for further care.     Hospital Course   Patient was successfully weaned to room air while awake. Her pain was well controlled with Tylenol, oxycodone, and muscle relaxers. She has been ambulatory on room air without shortness of breath or chest pain; SpO2 remains > 90% with ambulation. She was placed on O2 overnight; denies history of MERRICK. She has been referred for outpatient sleep study through . She will be following up with her PCP at [facility] next week and will discuss sleep study through [facility] since that is where all her medical care is. She is discharged with an outpatient PT order. OARRS reviewed, no red flags, 3 day supply of oxycodone PRN for severe pain sent to pharmacy on discharge. Patient remains on room air while awake and feels comfortable discharging home. Discharge plan reviewed with attending Dr. Sands.      Disposition: Patient stable for discharge home     Pertinent Physical Exam At Time of Discharge  Physical Exam  Constitutional:       General: She is not in acute distress.     Appearance: She is obese. She is not toxic-appearing.   HENT:      Head: Normocephalic and atraumatic.      Mouth/Throat:      Mouth: Mucous membranes are moist.   Eyes:      Conjunctiva/sclera: Conjunctivae normal.   Cardiovascular:      Rate and Rhythm: Normal rate and regular rhythm.   Pulmonary:      Effort: No respiratory distress.      Breath sounds: Normal breath sounds.      Comments: On room air, SpO2 > 92%  Abdominal:      General: There is no distension.      Palpations: Abdomen is soft.      Tenderness: There is no abdominal tenderness.   Musculoskeletal:         General: No swelling.   Skin:     General: Skin is warm and dry.   Neurological:      Mental Status: She is alert and oriented to person, place, and time.   Psychiatric:         Mood and Affect: Mood normal.          Behavior: Behavior normal.         Outpatient Follow-Up  No future appointments.      Madelyn Lovell PA-C